# Patient Record
Sex: MALE | Race: WHITE
[De-identification: names, ages, dates, MRNs, and addresses within clinical notes are randomized per-mention and may not be internally consistent; named-entity substitution may affect disease eponyms.]

---

## 2018-10-23 ENCOUNTER — HOSPITAL ENCOUNTER (EMERGENCY)
Dept: HOSPITAL 47 - EC | Age: 23
LOS: 1 days | Discharge: HOME | End: 2018-10-24
Payer: COMMERCIAL

## 2018-10-23 DIAGNOSIS — F17.200: ICD-10-CM

## 2018-10-23 DIAGNOSIS — R74.8: ICD-10-CM

## 2018-10-23 DIAGNOSIS — Z88.0: ICD-10-CM

## 2018-10-23 DIAGNOSIS — B34.9: Primary | ICD-10-CM

## 2018-10-23 DIAGNOSIS — Z79.899: ICD-10-CM

## 2018-10-23 DIAGNOSIS — Z88.6: ICD-10-CM

## 2018-10-23 DIAGNOSIS — F90.9: ICD-10-CM

## 2018-10-23 PROCEDURE — 71046 X-RAY EXAM CHEST 2 VIEWS: CPT

## 2018-10-23 PROCEDURE — 81001 URINALYSIS AUTO W/SCOPE: CPT

## 2018-10-23 PROCEDURE — 96360 HYDRATION IV INFUSION INIT: CPT

## 2018-10-23 PROCEDURE — 99284 EMERGENCY DEPT VISIT MOD MDM: CPT

## 2018-10-23 PROCEDURE — 36415 COLL VENOUS BLD VENIPUNCTURE: CPT

## 2018-10-23 PROCEDURE — 80053 COMPREHEN METABOLIC PANEL: CPT

## 2018-10-23 PROCEDURE — 82550 ASSAY OF CK (CPK): CPT

## 2018-10-23 PROCEDURE — 85025 COMPLETE CBC W/AUTO DIFF WBC: CPT

## 2018-10-23 PROCEDURE — 87502 INFLUENZA DNA AMP PROBE: CPT

## 2018-10-23 NOTE — ED
General Adult HPI





- General


Chief complaint: Recheck/Abnormal Lab/Rx


Stated complaint: DIZZINESS,VOMITING,ENT


Time Seen by Provider: 10/23/18 23:04


Source: patient


Mode of arrival: ambulatory


Limitations: no limitations





- History of Present Illness


Initial comments: 





Otf Isabel is a 22 yo male with no PMH who presents to the ED for evaluation 

of 4 days of feeling unwell. Patient reports generalized malaise, body aches, 

subjective fevers, chills, nausea, decreased oral intake, non-productive cough 

and headache.  She reports that his children had runny and stuffy nose with 

fever last week, he reports that on Saturday he began to feel unwell.  He 

reports that he has been trying to drink fluids but has had no appetite.  He 

hasn't taken any medications for his symptoms.  He came to the ER today 

concerned about missing work due to his symptoms.





- Related Data


 Home Medications











 Medication  Instructions  Recorded  Confirmed


 


Dextroamphetamine/Amphetamine 30 mg PO QAM 11/04/17 10/23/18





[Adderall Xr]   


 


Ibuprofen [Motrin] 800 mg PO Q8H PRN 11/04/17 10/23/18











 Allergies











Allergy/AdvReac Type Severity Reaction Status Date / Time


 


meloxicam [From Mobic] Allergy  Rash/Hives Verified 10/23/18 23:21


 


Penicillins Allergy  Anaphylaxis Verified 10/23/18 23:21














Review of Systems


ROS Statement: 


Those systems with pertinent positive or pertinent negative responses have been 

documented in the HPI.





ROS Other: All systems not noted in ROS Statement are negative.


Constitutional: Reports: fever, chills


Eyes: Denies: eye pain


ENT: Reports: throat pain


Respiratory: Reports: cough.  Denies: dyspnea


Cardiovascular: Denies: chest pain, palpitations


Endocrine: Reports: fatigue


Gastrointestinal: Reports: nausea


Genitourinary: Denies: urgency, dysuria


Musculoskeletal: Reports: myalgia.  Denies: back pain


Skin: Denies: rash


Neurological: Reports: headache





Past Medical History


Past Medical History: No Reported History


History of Any Multi-Drug Resistant Organisms: None Reported


Past Surgical History: No Surgical Hx Reported


Past Psychological History: ADD/ADHD


Smoking Status: Current every day smoker


Past Alcohol Use History: None Reported


Past Drug Use History: None Reported





General Exam





- General Exam Comments


Initial Comments: 


Physical Exam


GENERAL:


Patient is well-developed and well-nourished.  Patient is nontoxic and well-

hydrated and is in no distress.





HENT:


Normocephalic, Atraumatic. 





EYES:


PERRL, EOMI





PULMONARY:


Unlabored respirations.  No audible rales rhonchi or wheezing was noted.





CARDIOVASCULAR:


There is a regular rate and rhythm without any murmurs gallops or rubs.  





ABDOMEN:


Soft and nontender with normal bowel sounds. 





SKIN:


Skin is clear with no lesions or rashes and otherwise unremarkable.





: 


Deferred





NEUROLOGIC:


Patient is alert and oriented x3.  Moving all extremities spontaneously





MUSCULOSKELETAL:


Normal extremities with adequate strength and full range of motion.  No lower 

extremity swelling or edema.  No calf tenderness.  





PSYCHIATRIC:


Normal psychiatric evaluation.  





Limitations: no limitations





Limitations: no limitations





Course


 Vital Signs











  10/23/18 10/24/18





  22:59 01:16


 


Temperature 98.7 F 98.3 F


 


Pulse Rate 107 H 86


 


Respiratory 18 16





Rate  


 


Blood Pressure 137/80 133/83


 


O2 Sat by Pulse 99 100





Oximetry  














Medical Decision Making





- Medical Decision Making


Patient was seen and evaluated, history was obtained from the patient


Patient with multiple complaints, generalized malaise, body aches for a few 

days duration, appears dehydrated





Patient has not ever received her influenza vaccination due to history of his 

father having a serious ALLERGY to such.





Labs and x-ray were ordered


Labs with mildly elevated creatinine kinase, no other significant abnormalities 

appreciated


Chest x-ray with no evidence of pneumonia


Influenza was negative





At this point I suspect the patient has a viral illness, I discussed this with 

him, I discussed the importance of oral rehydration therapy, alternating 

Tylenol and Motrin for pain and fever management.  Return parameters were 

discussed and the patient was discharged home in stable condition.





- Lab Data


Result diagrams: 


 10/24/18 00:16





 10/24/18 00:16


 Lab Results











  10/24/18 10/24/18 10/24/18 Range/Units





  00:16 00:16 00:20 


 


WBC  10.6    (3.8-10.6)  k/uL


 


RBC  5.54    (4.30-5.90)  m/uL


 


Hgb  16.5    (13.0-17.5)  gm/dL


 


Hct  48.7    (39.0-53.0)  %


 


MCV  87.9    (80.0-100.0)  fL


 


MCH  29.8    (25.0-35.0)  pg


 


MCHC  33.9    (31.0-37.0)  g/dL


 


RDW  13.3    (11.5-15.5)  %


 


Plt Count  274    (150-450)  k/uL


 


Neutrophils %  72    %


 


Lymphocytes %  18    %


 


Monocytes %  6    %


 


Eosinophils %  3    %


 


Basophils %  0    %


 


Neutrophils #  7.7    (1.3-7.7)  k/uL


 


Lymphocytes #  1.9    (1.0-4.8)  k/uL


 


Monocytes #  0.7    (0-1.0)  k/uL


 


Eosinophils #  0.3    (0-0.7)  k/uL


 


Basophils #  0.0    (0-0.2)  k/uL


 


Sodium   140   (137-145)  mmol/L


 


Potassium   4.6   (3.5-5.1)  mmol/L


 


Chloride   103   ()  mmol/L


 


Carbon Dioxide   24   (22-30)  mmol/L


 


Anion Gap   13   mmol/L


 


BUN   12   (9-20)  mg/dL


 


Creatinine   0.77   (0.66-1.25)  mg/dL


 


Est GFR (CKD-EPI)AfAm   >90   (>60 ml/min/1.73 sqM)  


 


Est GFR (CKD-EPI)NonAf   >90   (>60 ml/min/1.73 sqM)  


 


Glucose   100 H   (74-99)  mg/dL


 


Calcium   10.2   (8.4-10.2)  mg/dL


 


Total Bilirubin   0.6   (0.2-1.3)  mg/dL


 


AST   42   (17-59)  U/L


 


ALT   79 H   (21-72)  U/L


 


Alkaline Phosphatase   106   ()  U/L


 


Creatine Kinase   235 H   ()  U/L


 


Total Protein   8.5 H   (6.3-8.2)  g/dL


 


Albumin   5.0   (3.5-5.0)  g/dL


 


Urine Color    Yellow  


 


Urine Appearance    Clear  (Clear)  


 


Urine pH    6.0  (5.0-8.0)  


 


Ur Specific Gravity    1.023  (1.001-1.035)  


 


Urine Protein    Trace H  (Negative)  


 


Urine Glucose (UA)    Negative  (Negative)  


 


Urine Ketones    Negative  (Negative)  


 


Urine Blood    Trace H  (Negative)  


 


Urine Nitrite    Negative  (Negative)  


 


Urine Bilirubin    Negative  (Negative)  


 


Urine Urobilinogen    <2.0  (<2.0)  mg/dL


 


Ur Leukocyte Esterase    Negative  (Negative)  


 


Urine RBC    4  (0-5)  /hpf


 


Urine WBC    2  (0-5)  /hpf


 


Urine Mucus    Many H  (None)  /hpf


 


Influenza Type A RNA     (Not Detectd)  


 


Influenza Type B (PCR)     (Not Detectd)  














  10/24/18 Range/Units





  00:20 


 


WBC   (3.8-10.6)  k/uL


 


RBC   (4.30-5.90)  m/uL


 


Hgb   (13.0-17.5)  gm/dL


 


Hct   (39.0-53.0)  %


 


MCV   (80.0-100.0)  fL


 


MCH   (25.0-35.0)  pg


 


MCHC   (31.0-37.0)  g/dL


 


RDW   (11.5-15.5)  %


 


Plt Count   (150-450)  k/uL


 


Neutrophils %   %


 


Lymphocytes %   %


 


Monocytes %   %


 


Eosinophils %   %


 


Basophils %   %


 


Neutrophils #   (1.3-7.7)  k/uL


 


Lymphocytes #   (1.0-4.8)  k/uL


 


Monocytes #   (0-1.0)  k/uL


 


Eosinophils #   (0-0.7)  k/uL


 


Basophils #   (0-0.2)  k/uL


 


Sodium   (137-145)  mmol/L


 


Potassium   (3.5-5.1)  mmol/L


 


Chloride   ()  mmol/L


 


Carbon Dioxide   (22-30)  mmol/L


 


Anion Gap   mmol/L


 


BUN   (9-20)  mg/dL


 


Creatinine   (0.66-1.25)  mg/dL


 


Est GFR (CKD-EPI)AfAm   (>60 ml/min/1.73 sqM)  


 


Est GFR (CKD-EPI)NonAf   (>60 ml/min/1.73 sqM)  


 


Glucose   (74-99)  mg/dL


 


Calcium   (8.4-10.2)  mg/dL


 


Total Bilirubin   (0.2-1.3)  mg/dL


 


AST   (17-59)  U/L


 


ALT   (21-72)  U/L


 


Alkaline Phosphatase   ()  U/L


 


Creatine Kinase   ()  U/L


 


Total Protein   (6.3-8.2)  g/dL


 


Albumin   (3.5-5.0)  g/dL


 


Urine Color   


 


Urine Appearance   (Clear)  


 


Urine pH   (5.0-8.0)  


 


Ur Specific Gravity   (1.001-1.035)  


 


Urine Protein   (Negative)  


 


Urine Glucose (UA)   (Negative)  


 


Urine Ketones   (Negative)  


 


Urine Blood   (Negative)  


 


Urine Nitrite   (Negative)  


 


Urine Bilirubin   (Negative)  


 


Urine Urobilinogen   (<2.0)  mg/dL


 


Ur Leukocyte Esterase   (Negative)  


 


Urine RBC   (0-5)  /hpf


 


Urine WBC   (0-5)  /hpf


 


Urine Mucus   (None)  /hpf


 


Influenza Type A RNA  Not Detected  (Not Detectd)  


 


Influenza Type B (PCR)  Not Detected  (Not Detectd)  














Disposition


Clinical Impression: 


 Viral illness





Disposition: HOME SELF-CARE


Condition: Good


Instructions:  Viral Syndrome (ED)


Is patient prescribed a controlled substance at d/c from ED?: No


Referrals: 


Nyla Rudd DO [Primary Care Provider] - 1-2 days

## 2018-10-24 VITALS
SYSTOLIC BLOOD PRESSURE: 138 MMHG | DIASTOLIC BLOOD PRESSURE: 89 MMHG | RESPIRATION RATE: 18 BRPM | TEMPERATURE: 99.9 F | HEART RATE: 98 BPM

## 2018-10-24 LAB
ALBUMIN SERPL-MCNC: 5 G/DL (ref 3.5–5)
ALP SERPL-CCNC: 106 U/L (ref 38–126)
ALT SERPL-CCNC: 79 U/L (ref 21–72)
ANION GAP SERPL CALC-SCNC: 13 MMOL/L
AST SERPL-CCNC: 42 U/L (ref 17–59)
BASOPHILS # BLD AUTO: 0 K/UL (ref 0–0.2)
BASOPHILS NFR BLD AUTO: 0 %
BUN SERPL-SCNC: 12 MG/DL (ref 9–20)
CALCIUM SPEC-MCNC: 10.2 MG/DL (ref 8.4–10.2)
CHLORIDE SERPL-SCNC: 103 MMOL/L (ref 98–107)
CK SERPL-CCNC: 235 U/L (ref 55–170)
CO2 SERPL-SCNC: 24 MMOL/L (ref 22–30)
EOSINOPHIL # BLD AUTO: 0.3 K/UL (ref 0–0.7)
EOSINOPHIL NFR BLD AUTO: 3 %
ERYTHROCYTE [DISTWIDTH] IN BLOOD BY AUTOMATED COUNT: 5.54 M/UL (ref 4.3–5.9)
ERYTHROCYTE [DISTWIDTH] IN BLOOD: 13.3 % (ref 11.5–15.5)
GLUCOSE SERPL-MCNC: 100 MG/DL (ref 74–99)
HCT VFR BLD AUTO: 48.7 % (ref 39–53)
HGB BLD-MCNC: 16.5 GM/DL (ref 13–17.5)
LYMPHOCYTES # SPEC AUTO: 1.9 K/UL (ref 1–4.8)
LYMPHOCYTES NFR SPEC AUTO: 18 %
MCH RBC QN AUTO: 29.8 PG (ref 25–35)
MCHC RBC AUTO-ENTMCNC: 33.9 G/DL (ref 31–37)
MCV RBC AUTO: 87.9 FL (ref 80–100)
MONOCYTES # BLD AUTO: 0.7 K/UL (ref 0–1)
MONOCYTES NFR BLD AUTO: 6 %
NEUTROPHILS # BLD AUTO: 7.7 K/UL (ref 1.3–7.7)
NEUTROPHILS NFR BLD AUTO: 72 %
PH UR: 6 [PH] (ref 5–8)
PLATELET # BLD AUTO: 274 K/UL (ref 150–450)
POTASSIUM SERPL-SCNC: 4.6 MMOL/L (ref 3.5–5.1)
PROT SERPL-MCNC: 8.5 G/DL (ref 6.3–8.2)
RBC UR QL: 4 /HPF (ref 0–5)
SODIUM SERPL-SCNC: 140 MMOL/L (ref 137–145)
SP GR UR: 1.02 (ref 1–1.03)
UROBILINOGEN UR QL STRIP: <2 MG/DL (ref ?–2)
WBC # BLD AUTO: 10.6 K/UL (ref 3.8–10.6)
WBC #/AREA URNS HPF: 2 /HPF (ref 0–5)

## 2018-10-24 NOTE — XR
EXAMINATION TYPE: XR chest 2V

 

DATE OF EXAM: 10/23/2018

 

COMPARISON: 11/4/2017

 

HISTORY: Fever and dizziness

 

TECHNIQUE:  Frontal and lateral views of the chest are obtained.

 

FINDINGS:  Heart and mediastinum are normal. Lungs are clear. Diaphragm is normal. Bony thorax appear
s normal.

 

IMPRESSION:  Normal chest. No change.

## 2018-11-26 ENCOUNTER — HOSPITAL ENCOUNTER (EMERGENCY)
Dept: HOSPITAL 47 - EC | Age: 23
Discharge: HOME | End: 2018-11-26
Payer: COMMERCIAL

## 2018-11-26 VITALS — DIASTOLIC BLOOD PRESSURE: 81 MMHG | HEART RATE: 112 BPM | TEMPERATURE: 102.1 F | SYSTOLIC BLOOD PRESSURE: 158 MMHG

## 2018-11-26 VITALS — RESPIRATION RATE: 18 BRPM

## 2018-11-26 DIAGNOSIS — Z88.0: ICD-10-CM

## 2018-11-26 DIAGNOSIS — F17.200: ICD-10-CM

## 2018-11-26 DIAGNOSIS — Z79.899: ICD-10-CM

## 2018-11-26 DIAGNOSIS — Z88.6: ICD-10-CM

## 2018-11-26 DIAGNOSIS — M54.9: Primary | ICD-10-CM

## 2018-11-26 DIAGNOSIS — R10.9: ICD-10-CM

## 2018-11-26 DIAGNOSIS — F90.9: ICD-10-CM

## 2018-11-26 DIAGNOSIS — R50.9: ICD-10-CM

## 2018-11-26 LAB
ALBUMIN SERPL-MCNC: 4.5 G/DL (ref 3.5–5)
ALP SERPL-CCNC: 211 U/L (ref 38–126)
ALT SERPL-CCNC: 205 U/L (ref 21–72)
AMYLASE SERPL-CCNC: 94 U/L (ref 30–110)
ANION GAP SERPL CALC-SCNC: 11 MMOL/L
AST SERPL-CCNC: 119 U/L (ref 17–59)
BASOPHILS # BLD AUTO: 0.1 K/UL (ref 0–0.2)
BASOPHILS NFR BLD AUTO: 1 %
BUN SERPL-SCNC: 14 MG/DL (ref 9–20)
CALCIUM SPEC-MCNC: 9.6 MG/DL (ref 8.4–10.2)
CHLORIDE SERPL-SCNC: 106 MMOL/L (ref 98–107)
CO2 SERPL-SCNC: 24 MMOL/L (ref 22–30)
EOSINOPHIL # BLD AUTO: 0.1 K/UL (ref 0–0.7)
EOSINOPHIL NFR BLD AUTO: 1 %
ERYTHROCYTE [DISTWIDTH] IN BLOOD BY AUTOMATED COUNT: 5.5 M/UL (ref 4.3–5.9)
ERYTHROCYTE [DISTWIDTH] IN BLOOD: 13.5 % (ref 11.5–15.5)
GLUCOSE SERPL-MCNC: 99 MG/DL (ref 74–99)
HCT VFR BLD AUTO: 49 % (ref 39–53)
HGB BLD-MCNC: 16.3 GM/DL (ref 13–17.5)
LIPASE SERPL-CCNC: 44 U/L (ref 23–300)
LYMPHOCYTES # SPEC AUTO: 3.5 K/UL (ref 1–4.8)
LYMPHOCYTES NFR SPEC AUTO: 50 %
MCH RBC QN AUTO: 29.6 PG (ref 25–35)
MCHC RBC AUTO-ENTMCNC: 33.2 G/DL (ref 31–37)
MCV RBC AUTO: 89.1 FL (ref 80–100)
MONOCYTES # BLD AUTO: 0.3 K/UL (ref 0–1)
MONOCYTES NFR BLD AUTO: 4 %
NEUTROPHILS # BLD AUTO: 2.4 K/UL (ref 1.3–7.7)
NEUTROPHILS NFR BLD AUTO: 35 %
PH UR: 5.5 [PH] (ref 5–8)
PLATELET # BLD AUTO: 220 K/UL (ref 150–450)
POTASSIUM SERPL-SCNC: 4.5 MMOL/L (ref 3.5–5.1)
PROT SERPL-MCNC: 8.1 G/DL (ref 6.3–8.2)
SODIUM SERPL-SCNC: 141 MMOL/L (ref 137–145)
SP GR UR: 1.02 (ref 1–1.03)
UROBILINOGEN UR QL STRIP: <2 MG/DL (ref ?–2)
WBC # BLD AUTO: 6.9 K/UL (ref 3.8–10.6)

## 2018-11-26 PROCEDURE — 80053 COMPREHEN METABOLIC PANEL: CPT

## 2018-11-26 PROCEDURE — 99285 EMERGENCY DEPT VISIT HI MDM: CPT

## 2018-11-26 PROCEDURE — 76705 ECHO EXAM OF ABDOMEN: CPT

## 2018-11-26 PROCEDURE — 82150 ASSAY OF AMYLASE: CPT

## 2018-11-26 PROCEDURE — 81003 URINALYSIS AUTO W/O SCOPE: CPT

## 2018-11-26 PROCEDURE — 83690 ASSAY OF LIPASE: CPT

## 2018-11-26 PROCEDURE — 74018 RADEX ABDOMEN 1 VIEW: CPT

## 2018-11-26 PROCEDURE — 85025 COMPLETE CBC W/AUTO DIFF WBC: CPT

## 2018-11-26 PROCEDURE — 36415 COLL VENOUS BLD VENIPUNCTURE: CPT

## 2018-11-26 NOTE — ED
Abdominal Pain HPI





- General


Chief Complaint: Abdominal Pain


Stated Complaint: Congestion/blood in urine


Time Seen by Provider: 11/26/18 19:16


Source: patient, RN notes reviewed, old records reviewed


Mode of arrival: ambulatory


Limitations: no limitations





- History of Present Illness


Initial Comments: 





This is a 23-year-old male the ER for evaluation.  Patient is multiple 

complaints mainly bilateral at back pain flank pain.  Increased cough and 

congestion with fever.  Patient has history of back pain chronic.  Patient 

states the symptoms of been on and off for about a month and usually was seen 

here in the ER about a month ago for the same.


MD Complaint: abdominal pain


-: month(s) (1)


Location: L flank, R flank


Radiation: back


Migration to: L flank, R flank, bilateral flank


Quality: aching


Consistency: constant


Improves With: nothing


Worsens With: movement


Treatments Prior to Arrival: NSAIDs





- Related Data


 Home Medications











 Medication  Instructions  Recorded  Confirmed


 


Dextroamphetamine/Amphetamine 30 mg PO QAM 11/04/17 11/26/18





[Adderall Xr]   


 


Ibuprofen [Motrin] 800 mg PO Q8H PRN 11/04/17 11/26/18


 


Albuterol Inhaler [Ventolin Hfa 2 puff INHALATION RT-Q6H PRN 11/26/18 11/26/18





Inhaler]   


 


Benzonatate [Tessalon Perles] 100 mg PO TID PRN 11/26/18 11/26/18











 Allergies











Allergy/AdvReac Type Severity Reaction Status Date / Time


 


meloxicam [From Mobic] Allergy  Rash/Hives Verified 11/26/18 20:08


 


Penicillins Allergy  Anaphylaxis Verified 11/26/18 20:08














Review of Systems


ROS Statement: 


Those systems with pertinent positive or pertinent negative responses have been 

documented in the HPI.





ROS Other: All systems not noted in ROS Statement are negative.





Past Medical History


Past Medical History: No Reported History


History of Any Multi-Drug Resistant Organisms: None Reported


Past Surgical History: No Surgical Hx Reported


Past Psychological History: ADD/ADHD


Smoking Status: Current every day smoker


Past Alcohol Use History: None Reported


Past Drug Use History: None Reported





General Exam


Limitations: no limitations


General appearance: alert, in no apparent distress


Head exam: Present: atraumatic, normocephalic, normal inspection


Eye exam: Present: normal appearance, PERRL, EOMI.  Absent: scleral icterus, 

conjunctival injection, periorbital swelling


ENT exam: Present: normal exam, mucous membranes moist


Neck exam: Present: normal inspection.  Absent: tenderness, meningismus, 

lymphadenopathy


Respiratory exam: Present: normal lung sounds bilaterally.  Absent: respiratory 

distress, wheezes, rales, rhonchi, stridor


Cardiovascular Exam: Present: normal rhythm, tachycardia, normal heart sounds.  

Absent: systolic murmur, diastolic murmur, rubs, gallop, clicks


GI/Abdominal exam: Present: soft, normal bowel sounds.  Absent: distended, 

tenderness, guarding, rebound, rigid


Extremities exam: Present: normal inspection, full ROM, normal capillary 

refill.  Absent: tenderness, pedal edema, joint swelling, calf tenderness


Back exam: Present: normal inspection, paraspinal tenderness (muscle).  Absent: 

tenderness (No midline tenderness)


Neurological exam: Present: alert, oriented X3, CN II-XII intact


Psychiatric exam: Present: normal affect, normal mood


Skin exam: Present: warm, dry, intact, normal color.  Absent: rash





Course


 Vital Signs











  11/26/18 11/26/18





  18:20 19:40


 


Temperature 100.2 F H 102.1 F H


 


Pulse Rate 107 H 112 H


 


Respiratory 18 18





Rate  


 


Blood Pressure 135/88 158/81


 


O2 Sat by Pulse 97 





Oximetry  














- Reevaluation(s)


Reevaluation #1: 





11/26/18 20:14


Medical record is reviewed





Patient is in no acute discomfort.  Patient states his pain is in his back 

which is pain that he always has





Patient states he is improved with fever control and hydration





This is with patient possibility of epidural infection, patient returned back 

pain worsens or fever increase








Medical Decision Making





- Medical Decision Making





3 male the ER with persistent fever times one month, fever is episodic patient 

also with back pain cough and congestion, runny nose.  Patient is afebrile here 

in the ER, back pain is not worse and is normal with no neurological deficit no 

new trauma.  Patient denies IV drug abuse, denies recent tattoos, ultrasound x-

ray negative.  Patient will be discharged home





- Lab Data


Result diagrams: 


 11/26/18 18:40





 11/26/18 18:40


 Lab Results











  11/26/18 11/26/18 11/26/18 Range/Units





  18:40 18:40 18:40 


 


WBC   6.9   (3.8-10.6)  k/uL


 


RBC   5.50   (4.30-5.90)  m/uL


 


Hgb   16.3   (13.0-17.5)  gm/dL


 


Hct   49.0   (39.0-53.0)  %


 


MCV   89.1   (80.0-100.0)  fL


 


MCH   29.6   (25.0-35.0)  pg


 


MCHC   33.2   (31.0-37.0)  g/dL


 


RDW   13.5   (11.5-15.5)  %


 


Plt Count   220   (150-450)  k/uL


 


Neutrophils %   35   %


 


Lymphocytes %   50   %


 


Monocytes %   4   %


 


Eosinophils %   1   %


 


Basophils %   1   %


 


Neutrophils #   2.4   (1.3-7.7)  k/uL


 


Lymphocytes #   3.5   (1.0-4.8)  k/uL


 


Monocytes #   0.3   (0-1.0)  k/uL


 


Eosinophils #   0.1   (0-0.7)  k/uL


 


Basophils #   0.1   (0-0.2)  k/uL


 


Sodium  141    (137-145)  mmol/L


 


Potassium  4.5    (3.5-5.1)  mmol/L


 


Chloride  106    ()  mmol/L


 


Carbon Dioxide  24    (22-30)  mmol/L


 


Anion Gap  11    mmol/L


 


BUN  14    (9-20)  mg/dL


 


Creatinine  0.79    (0.66-1.25)  mg/dL


 


Est GFR (CKD-EPI)AfAm  >90    (>60 ml/min/1.73 sqM)  


 


Est GFR (CKD-EPI)NonAf  >90    (>60 ml/min/1.73 sqM)  


 


Glucose  99    (74-99)  mg/dL


 


Calcium  9.6    (8.4-10.2)  mg/dL


 


Total Bilirubin  0.6    (0.2-1.3)  mg/dL


 


AST  119 H    (17-59)  U/L


 


ALT  205 H    (21-72)  U/L


 


Alkaline Phosphatase  211 H    ()  U/L


 


Total Protein  8.1    (6.3-8.2)  g/dL


 


Albumin  4.5    (3.5-5.0)  g/dL


 


Amylase  94    ()  U/L


 


Lipase  44    ()  U/L


 


Urine Color    Yellow  


 


Urine Appearance    Clear  (Clear)  


 


Urine pH    5.5  (5.0-8.0)  


 


Ur Specific Gravity    1.021  (1.001-1.035)  


 


Urine Protein    Negative  (Negative)  


 


Urine Glucose (UA)    Negative  (Negative)  


 


Urine Ketones    Negative  (Negative)  


 


Urine Blood    Negative  (Negative)  


 


Urine Nitrite    Negative  (Negative)  


 


Urine Bilirubin    Negative  (Negative)  


 


Urine Urobilinogen    <2.0  (<2.0)  mg/dL


 


Ur Leukocyte Esterase    Negative  (Negative)  














- Radiology Data


Radiology results: report reviewed (Ultrasound gallbladder x-ray KUB negative 

for acute disease), image reviewed





Disposition


Clinical Impression: 


 Fever, Back pain





Disposition: HOME SELF-CARE


Condition: Good


Instructions:  Fever in Adults (ED), Viral Syndrome (ED)


Is patient prescribed a controlled substance at d/c from ED?: No


Referrals: 


Nyla Rudd DO [Primary Care Provider] - 1-2 days

## 2018-11-26 NOTE — XR
EXAMINATION TYPE: XR KUB

 

DATE OF EXAM: 11/26/2018

 

COMPARISON: NONE

 

HISTORY: Abdominal pain

 

TECHNIQUE: 2 views upright

 

FINDINGS: Bowel gas pattern is normal. There is no sign of intestinal obstruction or pneumoperitoneum
. Fecal pattern is normal. Lung bases are clear. There are no pathologic calcifications over the kidn
eys.

 

IMPRESSION: Nonacute abdomen.

## 2018-11-26 NOTE — US
EXAMINATION TYPE: US gallbladder

 

DATE OF EXAM: 11/26/2018

 

COMPARISON: NONE

 

CLINICAL HISTORY: Pain. Pain

 

EXAM MEASUREMENTS:

 

Liver Length:  18.9 cm   

Gallbladder Wall:  0.3 cm   

CBD:  0.3 cm

Right Kidney:  11.7 x 4.7 x 4.2 cm

 

 

 

Pancreas:  Obscured by bowel gas

Liver:  Increased attenuation  

Gallbladder:  No stones seen

**Evidence for sonographic Adams's sign:  No

CBD:  wnl 

Right Kidney:  wnl 

 

 

 

IMPRESSION: Negative exam. No gallstones or dilated ducts.

## 2018-12-04 ENCOUNTER — HOSPITAL ENCOUNTER (OUTPATIENT)
Dept: HOSPITAL 47 - RADNMMAIN | Age: 23
Discharge: HOME | End: 2018-12-04
Attending: FAMILY MEDICINE
Payer: COMMERCIAL

## 2018-12-04 DIAGNOSIS — R11.0: Primary | ICD-10-CM

## 2018-12-04 PROCEDURE — 78226 HEPATOBILIARY SYSTEM IMAGING: CPT

## 2018-12-04 NOTE — NM
Nuclear medicine hepatobiliary scan.

 

HISTORY: Pain.

 

DOSAGE: The patient received 8 ounces of ensure plus and  4.8  mCi of Technetium 99m Choletec.  

 

FINDINGS: There is normal hepatic extraction.  The gallbladder is seen by 10 minutes.  There is bilia
ry to bowel clearance by 25 minutes.  Ejection fraction is 83%.

 

IMPRESSION:

1. No diagnostic evidence of cholecystitis.

2. Ejection fraction is 83%.

## 2018-12-20 ENCOUNTER — HOSPITAL ENCOUNTER (OUTPATIENT)
Dept: HOSPITAL 47 - OR | Age: 23
Discharge: HOME | End: 2018-12-20
Attending: SURGERY
Payer: COMMERCIAL

## 2018-12-20 VITALS — BODY MASS INDEX: 34.1 KG/M2

## 2018-12-20 VITALS — DIASTOLIC BLOOD PRESSURE: 78 MMHG | SYSTOLIC BLOOD PRESSURE: 122 MMHG | HEART RATE: 92 BPM

## 2018-12-20 VITALS — TEMPERATURE: 97 F

## 2018-12-20 VITALS — RESPIRATION RATE: 18 BRPM

## 2018-12-20 DIAGNOSIS — K81.9: Primary | ICD-10-CM

## 2018-12-20 DIAGNOSIS — F90.9: ICD-10-CM

## 2018-12-20 DIAGNOSIS — K81.1: ICD-10-CM

## 2018-12-20 DIAGNOSIS — Z88.0: ICD-10-CM

## 2018-12-20 DIAGNOSIS — F17.210: ICD-10-CM

## 2018-12-20 DIAGNOSIS — M19.90: ICD-10-CM

## 2018-12-20 DIAGNOSIS — Z79.899: ICD-10-CM

## 2018-12-20 DIAGNOSIS — Z88.6: ICD-10-CM

## 2018-12-20 PROCEDURE — 47562 LAPAROSCOPIC CHOLECYSTECTOMY: CPT

## 2018-12-20 PROCEDURE — 88304 TISSUE EXAM BY PATHOLOGIST: CPT

## 2018-12-20 RX ADMIN — FENTANYL CITRATE PRN MCG: 50 INJECTION, SOLUTION INTRAMUSCULAR; INTRAVENOUS at 12:03

## 2018-12-20 RX ADMIN — FENTANYL CITRATE PRN MCG: 50 INJECTION, SOLUTION INTRAMUSCULAR; INTRAVENOUS at 12:08

## 2018-12-20 NOTE — P.GSHP
History of Present Illness


H&P Date: 12/20/18


Chief Complaint: Right upper quadrant pain





This is a 23-year-old male presents today for laparoscopic cholecystectomy.  

Patient's had complaints of right upper quadrant pain.  His recent HIDA scan 

shows an elevated ejection fraction consistent with biliary hyperkinesia's and 

chronic cholecystitis.





Past Medical History


Past Medical History: Osteoarthritis (OA)


Additional Past Medical History / Comment(s): STATES ARTHRITIS IN BACK, ? 

ASTHMA - STATES DR UNSURE., CONSTIPATION, STATES HAVING ABDOMINAL PAIN WITH 

NAUSEA & VOMITING.


History of Any Multi-Drug Resistant Organisms: None Reported


Past Surgical History: No Surgical Hx Reported


Additional Past Anesthesia/Blood Transfusion Reaction / Comment(s): NO 

ANESTHESIA HX.


Past Psychological History: ADD/ADHD


Smoking Status: Current every day smoker


Past Alcohol Use History: None Reported


Additional Past Alcohol Use History / Comment(s): SMOKES 1/2 PPD.  SMOKING 

SINCE 12 YEARS OLD.


Past Drug Use History: None Reported





- Past Family History


  ** Mother


Family Medical History: No Reported History





Medications and Allergies


 Home Medications











 Medication  Instructions  Recorded  Confirmed  Type


 


Dextroamphetamine/Amphetamine 30 mg PO QAM 11/04/17 12/20/18 History





[Adderall Xr]    


 


Ibuprofen [Motrin] 800 mg PO Q8H PRN 11/04/17 12/17/18 History


 


Ondansetron [Zofran] 4 mg PO Q6HR PRN 12/17/18 12/17/18 History











 Allergies











Allergy/AdvReac Type Severity Reaction Status Date / Time


 


meloxicam [From Mobic] Allergy  Rash/Hives Verified 12/20/18 08:41


 


Penicillins Allergy  Anaphylaxis Verified 12/20/18 08:41














Surgical - Exam


 Vital Signs











Temp Pulse Resp BP Pulse Ox


 


 98.0 F   91   16   136/63   97 


 


 12/20/18 08:55  12/20/18 08:55  12/20/18 08:55  12/20/18 08:55  12/20/18 08:55














- General


well developed, well nourished, no distress





- Eyes


PERRL





- ENT


normal pinna





- Neck


no masses





- Respiratory


normal expansion





- Cardiovascular


Rhythm: regular





- Abdomen


Abdomen: soft, non tender





Assessment and Plan


Assessment: 





Right quadrant pain





Chronic cholecystitis





We'll perform laparoscopic cholecystectomy

## 2018-12-20 NOTE — P.OP
Date of Procedure: 12/20/18


Preoperative Diagnosis: 


Cholecystitis


Postoperative Diagnosis: 


Cholecystitis


Procedure(s) Performed: 


Laparoscopic cholecystectomy


Anesthesia: ABIMAEL


Surgeon: Rock Laird


Estimated Blood Loss (ml): 5


Pathology: other (gAll bladder)


Condition: stable


Disposition: PACU


Description of Procedure: 


The patient was placed on the operating table.   The patient received a general 

endotracheal tube anesthesia.    The patients abdomen was prepped and draped 

in the usual sterile fashion.    Through an infraumbilical stab incision, the 

fascia of the anterior abdominal wall was grasped with a pair of Kochers and 

then the Veress needle was placed in the peritoneal cavity.   Position of the 

Veress needle was confirmed with positive drop test.   The abdomen was then 

insufflated.  After adequate insufflation, the 10 mm trocar was placed in the 

peritoneal cavity.    Following this the laparoscope was placed in the 

peritoneal cavity. The patient was placed in the head-up, right side up 

position and then a 5 mm trocar was placed in the right lateral and right 

subcostal position under direct visualization.    A 8 mm trocar was placed in 

the epigastric position.  The gallbladder was grasped in the fundus and 

infundibulum.  Traction  on the gallbladder was placed in the lateral and the 

cephalad positions.  The triangle of Calot  was visualized..   The cystic duct 

was bluntly dissected until the union of the cystic duct and common bile duct 

was seen.    The cystic duct was then divided and sealed with the Harmonic 

scissors.  A PDS Endoloop was then placed throughout the cystic duct stump.  

The cystic artery divided and sealed with the Harmonic scissors.   The 

gallbladder was then removed from the liver bed using Harmonic scissors.   The 

gallbladder was then extracted through the epigastric port site.  Operative 

field was checked for any bleeding spots and Harmonic scissors was used to 

coagulate the liver bed.    The abdomen was irrigated.   The trocars were 

removed.  The skin was closed using interrupted 3-0 Vicryl suture.   Dermabond 

dressing were applied.   The patient tolerated the procedure well.

## 2020-08-05 ENCOUNTER — HOSPITAL ENCOUNTER (EMERGENCY)
Dept: HOSPITAL 47 - EC | Age: 25
Discharge: HOME | End: 2020-08-05
Payer: COMMERCIAL

## 2020-08-05 VITALS — HEART RATE: 91 BPM | SYSTOLIC BLOOD PRESSURE: 141 MMHG | TEMPERATURE: 97.8 F | DIASTOLIC BLOOD PRESSURE: 95 MMHG

## 2020-08-05 VITALS — RESPIRATION RATE: 18 BRPM

## 2020-08-05 DIAGNOSIS — Z88.6: ICD-10-CM
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
## 2020-08-05 DIAGNOSIS — F17.200: ICD-10-CM

## 2020-08-05 DIAGNOSIS — Z88.0: ICD-10-CM

## 2020-08-05 DIAGNOSIS — S70.12XA: Primary | ICD-10-CM

## 2020-08-05 DIAGNOSIS — Z79.899: ICD-10-CM
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
## 2020-08-05 DIAGNOSIS — Z79.1: ICD-10-CM

## 2020-08-05 DIAGNOSIS — F90.9: ICD-10-CM

## 2020-08-05 DIAGNOSIS — M19.90: ICD-10-CM

## 2020-08-05 DIAGNOSIS — M89.9: ICD-10-CM
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
## 2020-08-05 LAB
ALBUMIN SERPL-MCNC: 4.8 G/DL (ref 3.5–5)
ALP SERPL-CCNC: 85 U/L (ref 38–126)
ALT SERPL-CCNC: 99 U/L (ref 4–49)
ANION GAP SERPL CALC-SCNC: 9 MMOL/L
APTT BLD: 24.3 SEC (ref 22–30)
AST SERPL-CCNC: 49 U/L (ref 17–59)
BASOPHILS # BLD AUTO: 0 K/UL (ref 0–0.2)
BASOPHILS NFR BLD AUTO: 1 %
BUN SERPL-SCNC: 13 MG/DL (ref 9–20)
CALCIUM SPEC-MCNC: 10.3 MG/DL (ref 8.4–10.2)
CHLORIDE SERPL-SCNC: 107 MMOL/L (ref 98–107)
CO2 SERPL-SCNC: 23 MMOL/L (ref 22–30)
EOSINOPHIL # BLD AUTO: 0.2 K/UL (ref 0–0.7)
EOSINOPHIL NFR BLD AUTO: 3 %
ERYTHROCYTE [DISTWIDTH] IN BLOOD BY AUTOMATED COUNT: 5.23 M/UL (ref 4.3–5.9)
ERYTHROCYTE [DISTWIDTH] IN BLOOD: 13.1 % (ref 11.5–15.5)
GLUCOSE SERPL-MCNC: 110 MG/DL (ref 74–99)
HCT VFR BLD AUTO: 46.9 % (ref 39–53)
HGB BLD-MCNC: 15.5 GM/DL (ref 13–17.5)
INR PPP: 0.9 (ref ?–1.2)
LYMPHOCYTES # SPEC AUTO: 2.7 K/UL (ref 1–4.8)
LYMPHOCYTES NFR SPEC AUTO: 41 %
MCH RBC QN AUTO: 29.6 PG (ref 25–35)
MCHC RBC AUTO-ENTMCNC: 33 G/DL (ref 31–37)
MCV RBC AUTO: 89.8 FL (ref 80–100)
MONOCYTES # BLD AUTO: 0.4 K/UL (ref 0–1)
MONOCYTES NFR BLD AUTO: 6 %
NEUTROPHILS # BLD AUTO: 3.2 K/UL (ref 1.3–7.7)
NEUTROPHILS NFR BLD AUTO: 48 %
PLATELET # BLD AUTO: 309 K/UL (ref 150–450)
POTASSIUM SERPL-SCNC: 4.7 MMOL/L (ref 3.5–5.1)
PROT SERPL-MCNC: 7.6 G/DL (ref 6.3–8.2)
PT BLD: 9.8 SEC (ref 9–12)
SODIUM SERPL-SCNC: 139 MMOL/L (ref 137–145)
WBC # BLD AUTO: 6.7 K/UL (ref 3.8–10.6)

## 2020-08-05 PROCEDURE — 36415 COLL VENOUS BLD VENIPUNCTURE: CPT

## 2020-08-05 PROCEDURE — 80053 COMPREHEN METABOLIC PANEL: CPT

## 2020-08-05 PROCEDURE — 99284 EMERGENCY DEPT VISIT MOD MDM: CPT

## 2020-08-05 PROCEDURE — 85730 THROMBOPLASTIN TIME PARTIAL: CPT

## 2020-08-05 PROCEDURE — 73701 CT LOWER EXTREMITY W/DYE: CPT

## 2020-08-05 PROCEDURE — 85025 COMPLETE CBC W/AUTO DIFF WBC: CPT

## 2020-08-05 PROCEDURE — 85610 PROTHROMBIN TIME: CPT

## 2020-08-05 NOTE — ED
General Adult HPI





- General


Chief complaint: Extremity Injury, Lower


Stated complaint: [facility] - lt thigh injury


Time Seen by Provider: 08/05/20 09:50


Source: patient, RN notes reviewed, old records reviewed


Mode of arrival: ambulatory


Limitations: no limitations





- History of Present Illness


Initial comments: 





Patient is a 24-year-old male presents emergency department today for evaluation

for concern for left thigh and knee swelling.  Patient reports that 2 weeks ago 

he was hit by a excavator  while at work.  He states he initially thought up his

primary care doctor whom completed x-rays and have the Patient off of work.  His

work sent him to an urgent care who cleared him to go back to work.  Patient 

states that he was intended to follow-up with orthopedic in given an MRI but has

not been able to follow through with this due to Worker's Comp difficulties.  He

states that he was concerned because he's had worsening pain and with range of 

motion of the knee and nerve pain with flexing the knee.  He states is feels 

like his skin is stretching.





- Related Data


                                Home Medications











 Medication  Instructions  Recorded  Confirmed


 


Dextroamphetamine/Amphetamine 30 mg PO QAM 11/04/17 08/05/20





[Adderall Xr]   


 


Ibuprofen [Motrin] 800 mg PO QAM 11/04/17 08/05/20


 


HYDROcodone/APAP 7.5-325MG [Norco 1 tab PO QID PRN 08/05/20 08/05/20





7.5-325]   








                                  Previous Rx's











 Medication  Instructions  Recorded


 


HYDROcodone/APAP 5-325MG [Norco 1 tab PO Q6HR PRN #10 tab 08/05/20





5-325]  











                                    Allergies











Allergy/AdvReac Type Severity Reaction Status Date / Time


 


meloxicam [From Mobic] Allergy  Rash/Hives Verified 08/05/20 11:08


 


Penicillins Allergy  Anaphylaxis Verified 08/05/20 11:08














Review of Systems


ROS Statement: 


Those systems with pertinent positive or pertinent negative responses have been 

documented in the HPI.





ROS Other: All systems not noted in ROS Statement are negative.





Past Medical History


Past Medical History: Asthma, Osteoarthritis (OA)


Additional Past Medical History / Comment(s): STATES ARTHRITIS IN BACK, CONSTIPA

TION


History of Any Multi-Drug Resistant Organisms: None Reported


Past Surgical History: Cholecystectomy


Additional Past Anesthesia/Blood Transfusion Reaction / Comment(s): NO 

ANESTHESIA HX.


Past Psychological History: ADD/ADHD


Smoking Status: Current every day smoker


Past Alcohol Use History: Occasional


Past Drug Use History: Marijuana





- Past Family History


  ** Mother


Family Medical History: No Reported History





General Exam





- General Exam Comments


Initial Comments: 





This is a 24-year-old male.  Alert and oriented 3.  No significant distress.


Limitations: no limitations


General appearance: alert, in no apparent distress


Head exam: Present: atraumatic, normocephalic, normal inspection


Eye exam: Present: normal appearance, PERRL, EOMI.  Absent: scleral icterus, 

conjunctival injection, periorbital swelling


ENT exam: Present: normal exam, mucous membranes moist


Neck exam: Present: normal inspection.  Absent: tenderness, meningismus, 

lymphadenopathy


Respiratory exam: Present: normal lung sounds bilaterally.  Absent: respiratory 

distress, wheezes, rales, rhonchi, stridor


Cardiovascular Exam: Present: regular rate, normal rhythm, normal heart sounds. 

Absent: systolic murmur, diastolic murmur, rubs, gallop, clicks


GI/Abdominal exam: Present: soft, normal bowel sounds.  Absent: distended, 

tenderness, guarding, rebound, rigid


Extremities exam: Present: normal inspection, full ROM, normal capillary refill.

 Absent: tenderness, pedal edema, joint swelling, calf tenderness


  ** Left


Upper Leg exam: Present: swelling (over distal quadracep ).  Absent: normal 

inspection, full ROM


Knee exam: Present: tenderness.  Absent: normal inspection


Lower Leg exam: Present: normal inspection, full ROM


Foot/Toe exam: Present: normal inspection


Neurovascular tendon exam: Present: no vascular compromise


Back exam: Present: normal inspection


Neurological exam: Present: alert, oriented X3, CN II-XII intact


Psychiatric exam: Present: normal affect, normal mood


Skin exam: Present: warm, dry, intact, normal color.  Absent: rash





Course


                                   Vital Signs











  08/05/20 08/05/20





  09:46 11:34


 


Temperature 99.3 F 97.8 F


 


Pulse Rate 113 H 91


 


Respiratory 18 18





Rate  


 


Blood Pressure 163/98 141/95


 


O2 Sat by Pulse 98 99





Oximetry  














Medical Decision Making





- Medical Decision Making





Patient is a 24-year-old male presents for short stay with left thigh and knee 

swelling.  Patient reports that he was hit by an excavator work 2 weeks ago.  He

had x-rays at PCPs office was reportedly normal.  He reports he is able bear 

weight but when he has his leg down the swelling seems to be more painful and 

intense.  Patient has normal pulses distally.  He reports significant pain with 

range of motion of the knee.  Patient had a computed tomography scan with 

contrast of the knee.  It shows evidence of a significant hematoma over the 

anterior aspect of the thigh.  No evidence of fractures.  There is also evidence

of a 6.2 bone lesion which was most likely an chondroma.  I discussed the 

Patient needs follow-up with orthopedic for further clearance and possible 

biopsy for the small lesion.  Patient be discharged at this time with a short 

course of pain medication.  Advise close follow-up with or so for further 

clearance back to work.  Given a note until that time.








Patient is given a knee immobilizer and stated that this is too uncomfortable to

wear.  Advised to use it while at home.





- Lab Data


Result diagrams: 


                                 08/05/20 10:44





                                 08/05/20 10:44


                                   Lab Results











  08/05/20 08/05/20 08/05/20 Range/Units





  10:44 10:44 10:44 


 


WBC  6.7    (3.8-10.6)  k/uL


 


RBC  5.23    (4.30-5.90)  m/uL


 


Hgb  15.5    (13.0-17.5)  gm/dL


 


Hct  46.9    (39.0-53.0)  %


 


MCV  89.8    (80.0-100.0)  fL


 


MCH  29.6    (25.0-35.0)  pg


 


MCHC  33.0    (31.0-37.0)  g/dL


 


RDW  13.1    (11.5-15.5)  %


 


Plt Count  309    (150-450)  k/uL


 


Neutrophils %  48    %


 


Lymphocytes %  41    %


 


Monocytes %  6    %


 


Eosinophils %  3    %


 


Basophils %  1    %


 


Neutrophils #  3.2    (1.3-7.7)  k/uL


 


Lymphocytes #  2.7    (1.0-4.8)  k/uL


 


Monocytes #  0.4    (0-1.0)  k/uL


 


Eosinophils #  0.2    (0-0.7)  k/uL


 


Basophils #  0.0    (0-0.2)  k/uL


 


PT    9.8  (9.0-12.0)  sec


 


INR    0.9  (<1.2)  


 


APTT    24.3  (22.0-30.0)  sec


 


Sodium   139   (137-145)  mmol/L


 


Potassium   4.7   (3.5-5.1)  mmol/L


 


Chloride   107   ()  mmol/L


 


Carbon Dioxide   23   (22-30)  mmol/L


 


Anion Gap   9   mmol/L


 


BUN   13   (9-20)  mg/dL


 


Creatinine   0.84   (0.66-1.25)  mg/dL


 


Est GFR (CKD-EPI)AfAm   >90   (>60 ml/min/1.73 sqM)  


 


Est GFR (CKD-EPI)NonAf   >90   (>60 ml/min/1.73 sqM)  


 


Glucose   110 H   (74-99)  mg/dL


 


Calcium   10.3 H   (8.4-10.2)  mg/dL


 


Total Bilirubin   0.4   (0.2-1.3)  mg/dL


 


AST   49   (17-59)  U/L


 


ALT   99 H   (4-49)  U/L


 


Alkaline Phosphatase   85   ()  U/L


 


Total Protein   7.6   (6.3-8.2)  g/dL


 


Albumin   4.8   (3.5-5.0)  g/dL














- Radiology Data


Radiology results: report reviewed


CC knee with contrast shows a thin walled fluid collection anterior distal 

femoral level presumed related to recent injury.  No acute fracture dislocation.

 No intramuscular vascular injury noted.  There is a 6. fourth distal femoral 

diaphyseal chondroid lesion fever nonaggressive etiology such as endocrine 

neuroma however due to size more aggressive etiologies such chondrosarcoma is 

not entirely excluded. Follow up advised espiecial if pat has pain not related 

to trauma to this level. 





Disposition


Clinical Impression: 


 Thigh hematoma, Lesion of femur





Disposition: HOME SELF-CARE


Condition: Good


Instructions (If sedation given, give patient instructions):  Hematoma (ED)


Additional Instructions: 


Please use medication as discussed.  Follow up with orthopedic tomorrow. Please 

return to the emergency room if your symptoms increase or worsen or for any 

other concerns.


Prescriptions: 


HYDROcodone/APAP 5-325MG [Norco 5-325] 1 tab PO Q6HR PRN #10 tab


 PRN Reason: Pain


Is patient prescribed a controlled substance at d/c from ED?: Yes


If prescribed controlled substance>3 days was MAPS reviewed?: Prescribed <3 Days


If opioid is for acute pain is fill amount 7 days or less?: Yes


If Rx opioid, was Start Talking consent form obtained?: Yes


Referrals: 


Nyla Rudd DO [Primary Care Provider] - 1-2 days


Dipak Aguilar DO [Doctor of Osteopathic Medicine] - 1-2 days


Time of Disposition: 12:02

## 2020-08-05 NOTE — CT
EXAMINATION TYPE: CT knee LT w con

 

DATE OF EXAM: 8/5/2020

 

COMPARISON: None.

 

HISTORY: Left distal femur injury and hematoma. Persistent pain and swelling after recent injury.

 

CT DLP: 309.7 mGycm

Automated exposure control for dose reduction was used.

 

CONTRAST: 

Performed with IV Contrast, patient injected with 100ml mL of Isovue 300.

 

FINDINGS: 

No acute fracture or dislocation in the left knee. Tricompartment joint spaces are preserved.

 

No significant joint effusion. No popliteal cyst. Patency of the distal superficial femoral artery an
d popliteal artery with successful visualization of bifurcation and trifurcation. No injury.

 

There is moderate superficial infrapatellar ill-defined fluid. There is more prominent thin-walled fl
uid collection in the superior prepatellar space measuring roughly 11 to 12 cm transversely axial kim
ge 60. Craniocaudal length roughly 16 16 cm sagittal image 31. All fluid is superficial to the anteri
or muscles. There is some more mild to moderate ill-defined surrounding fluid noted. Fluid more promi
nent medial to the midline.

 

Within the distal femoral diaphysis there is an oval 6.4 cm intramedullary lesion with calcifications
  along the periphery. No bony destruction or adjacent soft tissue mass.

Suspect nonaggressive etiology such as enchondroma.

 

IMPRESSION: 

1. Thin-walled fluid collection anterior distal femoral level presumed related to recent injury. No a
cute fracture or dislocation. No intramuscular or vascular injury.

2. There is 6.4 cm distal femoral diaphyseal chondroid lesion favor nonaggressive etiology such as en
chondroma however due to size more aggressive etiologies such as chondrosarcoma is not entirely exclu
ded. Follow-up advised especially if patient has pain not related to trauma localized to this level.

## 2020-10-14 ENCOUNTER — HOSPITAL ENCOUNTER (OUTPATIENT)
Dept: HOSPITAL 47 - LABWHC1 | Age: 25
Discharge: HOME | End: 2020-10-14
Attending: ORTHOPAEDIC SURGERY
Payer: COMMERCIAL

## 2020-10-14 DIAGNOSIS — S70.12XD: Primary | ICD-10-CM

## 2020-10-14 DIAGNOSIS — M89.8X5: ICD-10-CM

## 2020-10-14 DIAGNOSIS — M79.652: ICD-10-CM

## 2020-10-14 DIAGNOSIS — M25.562: ICD-10-CM

## 2020-10-14 DIAGNOSIS — L76.34: ICD-10-CM

## 2020-10-14 DIAGNOSIS — M25.462: ICD-10-CM

## 2020-10-14 PROCEDURE — 87070 CULTURE OTHR SPECIMN AEROBIC: CPT

## 2020-10-14 PROCEDURE — 87075 CULTR BACTERIA EXCEPT BLOOD: CPT

## 2020-10-14 PROCEDURE — 87205 SMEAR GRAM STAIN: CPT

## 2021-02-24 ENCOUNTER — HOSPITAL ENCOUNTER (EMERGENCY)
Dept: HOSPITAL 47 - EC | Age: 26
Discharge: HOME | End: 2021-02-24
Payer: COMMERCIAL

## 2021-02-24 VITALS
SYSTOLIC BLOOD PRESSURE: 150 MMHG | RESPIRATION RATE: 20 BRPM | TEMPERATURE: 98.3 F | DIASTOLIC BLOOD PRESSURE: 97 MMHG | HEART RATE: 108 BPM

## 2021-02-24 DIAGNOSIS — M19.09: ICD-10-CM

## 2021-02-24 DIAGNOSIS — Z88.6: ICD-10-CM

## 2021-02-24 DIAGNOSIS — Z79.1: ICD-10-CM

## 2021-02-24 DIAGNOSIS — Z79.899: ICD-10-CM

## 2021-02-24 DIAGNOSIS — F17.200: ICD-10-CM

## 2021-02-24 DIAGNOSIS — K04.7: Primary | ICD-10-CM

## 2021-02-24 DIAGNOSIS — F90.9: ICD-10-CM

## 2021-02-24 DIAGNOSIS — Z88.0: ICD-10-CM

## 2021-02-24 PROCEDURE — 99283 EMERGENCY DEPT VISIT LOW MDM: CPT

## 2021-02-24 NOTE — ED
ENT HPI





- General


Stated complaint: Face swelling


Time Seen by Provider: 02/24/21 19:50


Source: patient, RN notes reviewed


Mode of arrival: ambulatory


Limitations: no limitations





- History of Present Illness


Initial comments: 


25 year old male presents emergency Department with chief complaint of right-

sided facial swelling.  Patient states that he's had recurrent dental infections

he states he saw dentist yesterday who wanted to pull his molar but states they 

did not feel comfortable with them.  Patient states his been on 2 rounds of 

antibiotics states most recent and bright was azithromycin.  He has not 

appointment with dentist on Friday.  Patient denies any difficulty swallowing 

patient states there is mild pain, pressure feeling on the right side.








- Related Data


                                Home Medications











 Medication  Instructions  Recorded  Confirmed


 


Dextroamphetamine/Amphetamine 30 mg PO QAM 11/04/17 08/05/20





[Adderall Xr]   


 


Ibuprofen [Motrin] 800 mg PO QAM 11/04/17 08/05/20


 


HYDROcodone/APAP 7.5-325MG [Norco 1 tab PO QID PRN 08/05/20 08/05/20





7.5-325]   








                                  Previous Rx's











 Medication  Instructions  Recorded


 


HYDROcodone/APAP 5-325MG [Norco 1 tab PO Q6HR PRN #10 tab 08/05/20





5-325]  


 


Clindamycin HCl 300 mg PO Q6HR #40 cap 02/24/21











                                    Allergies











Allergy/AdvReac Type Severity Reaction Status Date / Time


 


meloxicam [From Mobic] Allergy  Rash/Hives Verified 02/24/21 19:58


 


Penicillins Allergy  Anaphylaxis Verified 02/24/21 19:58














Review of Systems


ROS Statement: 


Those systems with pertinent positive or pertinent negative responses have been 

documented in the HPI.





ROS Other: All systems not noted in ROS Statement are negative.





Past Medical History


Past Medical History: Asthma, Osteoarthritis (OA)


Additional Past Medical History / Comment(s): STATES ARTHRITIS IN BACK, 

CONSTIPATION


History of Any Multi-Drug Resistant Organisms: None Reported


Past Surgical History: Cholecystectomy


Additional Past Anesthesia/Blood Transfusion Reaction / Comment(s): NO 

ANESTHESIA HX.


Past Psychological History: ADD/ADHD


Smoking Status: Current every day smoker


Past Alcohol Use History: Occasional


Past Drug Use History: Marijuana





- Past Family History


  ** Mother


Family Medical History: No Reported History





General Exam


General appearance: alert, in no apparent distress


Head exam: Present: atraumatic, normocephalic, normal inspection


Eye exam: Present: normal appearance, PERRL, EOMI.  Absent: scleral icterus, 

conjunctival injection, periorbital swelling


ENT exam: Present: mucous membranes moist, TM's normal bilaterally, normal 

external ear exam, other (Right mandibular swelling and mild trismus no 

tripoding).  Absent: normal exam (Dental Tere #32), normal oropharynx


Neck exam: Present: normal inspection, full ROM.  Absent: tenderness, 

meningismus, lymphadenopathy


Respiratory exam: Present: normal lung sounds bilaterally.  Absent: respiratory 

distress, wheezes, rales, rhonchi, stridor


Cardiovascular Exam: Present: regular rate, normal rhythm, normal heart sounds. 

Absent: systolic murmur, diastolic murmur, rubs, gallop, clicks


GI/Abdominal exam: Present: soft, normal bowel sounds.  Absent: distended, 

tenderness, guarding, rebound, rigid





Course


                                   Vital Signs











  02/24/21





  19:51


 


Temperature 98.3 F


 


Pulse Rate 108 H


 


Respiratory 20





Rate 


 


Blood Pressure 150/97


 


O2 Sat by Pulse 97





Oximetry 














Medical Decision Making





- Medical Decision Making





35 over presented for facial swelling.  Patient has dental infection.  Patient 

was given clindamycin will be discharged on clindamycin.  Patient has a follow-

up appointment on Friday.  Patient will be discharged stable condition.





Disposition


Clinical Impression: 


 Dental infection, Pain, dental





Disposition: HOME SELF-CARE


Condition: Stable


Instructions (If sedation given, give patient instructions):  Toothache (ED)


Additional Instructions: 


Please return to the Emergency Department if symptoms worsen or any other 

concerns.


Prescriptions: 


Clindamycin HCl 300 mg PO Q6HR #40 cap


Is patient prescribed a controlled substance at d/c from ED?: No


Referrals: 


Nyla Rudd DO [Primary Care Provider] - 1-2 days


Time of Disposition: 19:58

## 2021-02-25 ENCOUNTER — HOSPITAL ENCOUNTER (EMERGENCY)
Dept: HOSPITAL 47 - EC | Age: 26
Discharge: HOME | End: 2021-02-25
Payer: COMMERCIAL

## 2021-02-25 VITALS — SYSTOLIC BLOOD PRESSURE: 130 MMHG | DIASTOLIC BLOOD PRESSURE: 83 MMHG | HEART RATE: 98 BPM | RESPIRATION RATE: 18 BRPM

## 2021-02-25 VITALS — TEMPERATURE: 99.6 F

## 2021-02-25 DIAGNOSIS — Z79.899: ICD-10-CM

## 2021-02-25 DIAGNOSIS — F17.200: ICD-10-CM

## 2021-02-25 DIAGNOSIS — F90.9: ICD-10-CM

## 2021-02-25 DIAGNOSIS — Z88.0: ICD-10-CM

## 2021-02-25 DIAGNOSIS — Z88.6: ICD-10-CM

## 2021-02-25 DIAGNOSIS — K04.7: Primary | ICD-10-CM

## 2021-02-25 LAB
ALBUMIN SERPL-MCNC: 4.9 G/DL (ref 3.5–5)
ALP SERPL-CCNC: 111 U/L (ref 38–126)
ALT SERPL-CCNC: 105 U/L (ref 4–49)
ANION GAP SERPL CALC-SCNC: 12 MMOL/L
AST SERPL-CCNC: 50 U/L (ref 17–59)
BASOPHILS # BLD AUTO: 0 K/UL (ref 0–0.2)
BASOPHILS NFR BLD AUTO: 0 %
BUN SERPL-SCNC: 17 MG/DL (ref 9–20)
CALCIUM SPEC-MCNC: 9.8 MG/DL (ref 8.4–10.2)
CHLORIDE SERPL-SCNC: 103 MMOL/L (ref 98–107)
CO2 SERPL-SCNC: 23 MMOL/L (ref 22–30)
EOSINOPHIL # BLD AUTO: 0.2 K/UL (ref 0–0.7)
EOSINOPHIL NFR BLD AUTO: 1 %
ERYTHROCYTE [DISTWIDTH] IN BLOOD BY AUTOMATED COUNT: 5.19 M/UL (ref 4.3–5.9)
ERYTHROCYTE [DISTWIDTH] IN BLOOD: 12.8 % (ref 11.5–15.5)
GLUCOSE SERPL-MCNC: 121 MG/DL (ref 74–99)
HCT VFR BLD AUTO: 44.7 % (ref 39–53)
HGB BLD-MCNC: 15.5 GM/DL (ref 13–17.5)
LYMPHOCYTES # SPEC AUTO: 2.1 K/UL (ref 1–4.8)
LYMPHOCYTES NFR SPEC AUTO: 18 %
MAGNESIUM SPEC-SCNC: 1.8 MG/DL (ref 1.6–2.3)
MCH RBC QN AUTO: 29.9 PG (ref 25–35)
MCHC RBC AUTO-ENTMCNC: 34.7 G/DL (ref 31–37)
MCV RBC AUTO: 86.1 FL (ref 80–100)
MONOCYTES # BLD AUTO: 0.6 K/UL (ref 0–1)
MONOCYTES NFR BLD AUTO: 5 %
NEUTROPHILS # BLD AUTO: 8.4 K/UL (ref 1.3–7.7)
NEUTROPHILS NFR BLD AUTO: 74 %
PLATELET # BLD AUTO: 304 K/UL (ref 150–450)
POTASSIUM SERPL-SCNC: 4.6 MMOL/L (ref 3.5–5.1)
PROT SERPL-MCNC: 8.1 G/DL (ref 6.3–8.2)
SODIUM SERPL-SCNC: 138 MMOL/L (ref 137–145)
WBC # BLD AUTO: 11.2 K/UL (ref 3.8–10.6)

## 2021-02-25 PROCEDURE — 96375 TX/PRO/DX INJ NEW DRUG ADDON: CPT

## 2021-02-25 PROCEDURE — 96361 HYDRATE IV INFUSION ADD-ON: CPT

## 2021-02-25 PROCEDURE — 83735 ASSAY OF MAGNESIUM: CPT

## 2021-02-25 PROCEDURE — 85025 COMPLETE CBC W/AUTO DIFF WBC: CPT

## 2021-02-25 PROCEDURE — 70491 CT SOFT TISSUE NECK W/DYE: CPT

## 2021-02-25 PROCEDURE — 84100 ASSAY OF PHOSPHORUS: CPT

## 2021-02-25 PROCEDURE — 80053 COMPREHEN METABOLIC PANEL: CPT

## 2021-02-25 PROCEDURE — 36415 COLL VENOUS BLD VENIPUNCTURE: CPT

## 2021-02-25 PROCEDURE — 96365 THER/PROPH/DIAG IV INF INIT: CPT

## 2021-02-25 PROCEDURE — 99284 EMERGENCY DEPT VISIT MOD MDM: CPT

## 2021-02-25 NOTE — ED
Recheck HPI





- General


Chief Complaint: Dental/Oral


Stated Complaint: Dental Pain


Time Seen by Provider: 02/25/21 03:11


Source: patient, family, RN notes reviewed, old records reviewed


Mode of arrival: ambulatory


Limitations: no limitations





- History of Present Illness


Initial Comments: 





This is a 25-year-old male DF for evaluation of dental pain.  Patient has 

history of dental caries seen in ER prior for evaluation.  Patient concern for 

recent development of significant swelling of the right side of his face.  No 

shortness of breath no other complaints no fever patient does have point with 

dentist for tooth removal


MD Complaint: other (Significant swelling or face was started tonight)


-: days(s)


Returns Today for: persistent/worsening pain related to initial visit


Symptoms Since Prior Visit: worsening pain (Worsening swelling)


Context: other (Evaluation for significant swelling of recent diagnosis tooth 

infection)


Associated Symptoms: none


Treatments Prior to Arrival: other medications, Given Antibiotics on





- Related Data


                                Home Medications











 Medication  Instructions  Recorded  Confirmed


 


Dextroamphetamine/Amphetamine 30 mg PO DAILY 03/03/21 03/03/21





[Dextroamphetamine/Amphetamine ER   





30 mg Cap]   








                                  Previous Rx's











 Medication  Instructions  Recorded


 


cefTRIAXone [Rocephin] 2 gm IVPB Q24H #10 bag 03/03/21


 


metroNIDAZOLE [Flagyl] 500 mg PO TID #30 tab 03/03/21


 


HYDROcodone/APAP 7.5-325MG [Norco 2 each PO Q6H PRN #12 tab 03/04/21





7.5-325]  


 


Ibuprofen [Motrin] 800 mg PO TID PRN #30 tab 03/04/21











                                    Allergies











Allergy/AdvReac Type Severity Reaction Status Date / Time


 


meloxicam [From Mobic] Allergy  Rash/Hives Verified 02/26/21 18:23


 


Penicillins Allergy  Anaphylaxis Verified 02/26/21 18:23














Review of Systems


ROS Statement: 


Those systems with pertinent positive or pertinent negative responses have been 

documented in the HPI.





ROS Other: All systems not noted in ROS Statement are negative.





Past Medical History


Past Medical History: Asthma, Osteoarthritis (OA)


Additional Past Medical History / Comment(s): STATES ARTHRITIS IN BACK, 

CONSTIPATION


History of Any Multi-Drug Resistant Organisms: None Reported


Past Surgical History: Cholecystectomy


Additional Past Anesthesia/Blood Transfusion Reaction / Comment(s): NO 

ANESTHESIA HX.


Past Psychological History: ADD/ADHD


Smoking Status: Current every day smoker


Past Alcohol Use History: Occasional


Past Drug Use History: Marijuana





- Past Family History


  ** Mother


Family Medical History: No Reported History





General Exam


Limitations: no limitations


General appearance: alert, in no apparent distress


Head exam: Present: atraumatic, normocephalic, normal inspection, other (Patient

does have significant swelling of the right side)


Eye exam: Present: normal appearance, PERRL, EOMI.  Absent: scleral icterus, 

conjunctival injection, periorbital swelling


ENT exam: Present: normal exam, mucous membranes moist


Neck exam: Present: normal inspection.  Absent: tenderness, meningismus, lym

phadenopathy


Respiratory exam: Present: normal lung sounds bilaterally.  Absent: respiratory 

distress, wheezes, rales, rhonchi, stridor


Cardiovascular Exam: Present: regular rate, normal rhythm, normal heart sounds. 

Absent: systolic murmur, diastolic murmur, rubs, gallop, clicks


GI/Abdominal exam: Present: soft, normal bowel sounds.  Absent: distended, 

tenderness, guarding, rebound, rigid


Extremities exam: Present: normal inspection, full ROM, normal capillary refill.

 Absent: tenderness, pedal edema, joint swelling, calf tenderness


Back exam: Present: normal inspection


Neurological exam: Present: alert, oriented X3, CN II-XII intact


Psychiatric exam: Present: normal affect, normal mood


Skin exam: Present: warm, dry, intact, normal color.  Absent: rash





Course


                                   Vital Signs











  02/25/21 02/25/21





  02:58 05:30


 


Temperature 99.6 F 


 


Pulse Rate 111 H 98


 


Respiratory 24 18





Rate  


 


Blood Pressure 132/85 130/83


 


O2 Sat by Pulse 97 97





Oximetry  














- Reevaluation(s)


Reevaluation #1: 





Medical record is reviewed





Patient has improved symptoms here in the ER





Patient feels better





Patient family informed results questions are answered





Patient feels good for discharge











Medical Decision Making





- Medical Decision Making





Lasix male history of recurrent or worsening dental abscess.  Patient continue 

follow-up, continue antibiotics and symptom management.





- Lab Data


Result diagrams: 


                                 02/25/21 03:50





                                 02/25/21 03:50


                                   Lab Results











  02/25/21 02/25/21 Range/Units





  03:50 03:50 


 


WBC  11.2 H   (3.8-10.6)  k/uL


 


RBC  5.19   (4.30-5.90)  m/uL


 


Hgb  15.5   (13.0-17.5)  gm/dL


 


Hct  44.7   (39.0-53.0)  %


 


MCV  86.1   (80.0-100.0)  fL


 


MCH  29.9   (25.0-35.0)  pg


 


MCHC  34.7   (31.0-37.0)  g/dL


 


RDW  12.8   (11.5-15.5)  %


 


Plt Count  304   (150-450)  k/uL


 


MPV  7.6   


 


Neutrophils %  74   %


 


Lymphocytes %  18   %


 


Monocytes %  5   %


 


Eosinophils %  1   %


 


Basophils %  0   %


 


Neutrophils #  8.4 H   (1.3-7.7)  k/uL


 


Lymphocytes #  2.1   (1.0-4.8)  k/uL


 


Monocytes #  0.6   (0-1.0)  k/uL


 


Eosinophils #  0.2   (0-0.7)  k/uL


 


Basophils #  0.0   (0-0.2)  k/uL


 


Sodium   138  (137-145)  mmol/L


 


Potassium   4.6  (3.5-5.1)  mmol/L


 


Chloride   103  ()  mmol/L


 


Carbon Dioxide   23  (22-30)  mmol/L


 


Anion Gap   12  mmol/L


 


BUN   17  (9-20)  mg/dL


 


Creatinine   0.75  (0.66-1.25)  mg/dL


 


Est GFR (CKD-EPI)AfAm   >90  (>60 ml/min/1.73 sqM)  


 


Est GFR (CKD-EPI)NonAf   >90  (>60 ml/min/1.73 sqM)  


 


Glucose   121 H  (74-99)  mg/dL


 


Calcium   9.8  (8.4-10.2)  mg/dL


 


Phosphorus   3.2  (2.5-4.5)  mg/dL


 


Magnesium   1.8  (1.6-2.3)  mg/dL


 


Total Bilirubin   0.8  (0.2-1.3)  mg/dL


 


AST   50  (17-59)  U/L


 


ALT   105 H  (4-49)  U/L


 


Alkaline Phosphatase   111  ()  U/L


 


Total Protein   8.1  (6.3-8.2)  g/dL


 


Albumin   4.9  (3.5-5.0)  g/dL














- Radiology Data


Radiology results: report reviewed (CT does show dental abscess with surrounding

inflammation), image reviewed





Disposition


Clinical Impression: 


 Dental infection, Pain, dental, Dental abscess





Disposition: HOME SELF-CARE


Condition: Good


Instructions (If sedation given, give patient instructions):  Dental Abscess 

(ED), Toothache (ED)


Is patient prescribed a controlled substance at d/c from ED?: No


Referrals: 


Nyla Rudd DO [Primary Care Provider] - 1-2 days

## 2021-02-25 NOTE — CT
EXAM:

  CT Neck With Intravenous Contrast

 

CLINICAL HISTORY:

  ITS.REASON CT Reason: abscess

 

TECHNIQUE:

  Axial computed tomography images of the neck with intravenous contrast. 

 CTDI is 19.441 mGy and DLP is 712 mGy-cm.  This CT exam was performed 

using one or more of the following dose reduction techniques: automated 

exposure control, adjustment of the mA and/or kV according to patient 

size, and/or use of iterative reconstruction technique.

 

COMPARISON:

  No relevant prior studies available.

 

FINDINGS:

  Oropharynx:  No significant tonsillar enlargement.  No peritonsillar 

abscess.

  Hypopharynx:  Unremarkable.

  Larynx:  Normal epiglottis.

  Trachea:  Unremarkable.

  Retropharyngeal space:  Unremarkable.

  Submandibular/parotid glands:  Glands are normal in size.

  Thyroid:  No nodules.

  Bones/joints:  No acute fracture.  Periapical lucency involving the 

right maxillary canine.

  Soft tissues: Significant soft tissue swelling in the submental and 

right more than left submandibular region and right lower face. This 

process extends into the lower deep neck space to the level of the 

thyroid glands. 

  Vasculature:  Unremarkable.

  Lymph nodes: Enlarged lymph nodes.

  Sinuses:  Unremarkable.  No acute sinusitis.

  Mastoid air cells:  Unremarkable.  No mastoid effusion.

  Lung apices:  Unremarkable.

  Pulmonary arteries dilated to 3.2 cm.

 

IMPRESSION:     

  

1.  Significant inflammation and swelling in the right lower face/neck 

involving the submental/right more than left submandibular regions.  This 

process extends into the lower deep neck space to the level of the 

thyroid glands.  Likely infectious process.  No abscess.  No airway 

compromise.

 

2.  Dilated pulmonary artery, correlate with pulmonary arterial 

hypertension.

 

3.  Odontogenic disease involving the right maxillary canine.  No 

associated abscess.

<MYCVCSECTION>

 

Communications:

 

02/25/21 04:52 Call Doctor Regarding Above results, called  Dr. Morgan 

on 02/25 04:52 (-05:00)

## 2021-02-26 ENCOUNTER — HOSPITAL ENCOUNTER (INPATIENT)
Dept: HOSPITAL 47 - EC | Age: 26
LOS: 6 days | Discharge: HOME HEALTH | DRG: 872 | End: 2021-03-04
Attending: INTERNAL MEDICINE | Admitting: INTERNAL MEDICINE
Payer: COMMERCIAL

## 2021-02-26 DIAGNOSIS — J45.909: ICD-10-CM

## 2021-02-26 DIAGNOSIS — Z90.49: ICD-10-CM

## 2021-02-26 DIAGNOSIS — F90.9: ICD-10-CM

## 2021-02-26 DIAGNOSIS — L03.211: ICD-10-CM

## 2021-02-26 DIAGNOSIS — A41.4: Primary | ICD-10-CM

## 2021-02-26 DIAGNOSIS — Z87.19: ICD-10-CM

## 2021-02-26 DIAGNOSIS — Z88.6: ICD-10-CM

## 2021-02-26 DIAGNOSIS — L02.01: ICD-10-CM

## 2021-02-26 DIAGNOSIS — Z20.822: ICD-10-CM

## 2021-02-26 DIAGNOSIS — S02.5XXA: ICD-10-CM

## 2021-02-26 DIAGNOSIS — K02.9: ICD-10-CM

## 2021-02-26 DIAGNOSIS — Z88.0: ICD-10-CM

## 2021-02-26 DIAGNOSIS — M47.9: ICD-10-CM

## 2021-02-26 DIAGNOSIS — K04.7: ICD-10-CM

## 2021-02-26 DIAGNOSIS — K59.00: ICD-10-CM

## 2021-02-26 DIAGNOSIS — K12.2: ICD-10-CM

## 2021-02-26 DIAGNOSIS — F98.8: ICD-10-CM

## 2021-02-26 DIAGNOSIS — Z98.890: ICD-10-CM

## 2021-02-26 DIAGNOSIS — Z71.6: ICD-10-CM

## 2021-02-26 DIAGNOSIS — Z79.899: ICD-10-CM

## 2021-02-26 DIAGNOSIS — F17.210: ICD-10-CM

## 2021-02-26 LAB
ALBUMIN SERPL-MCNC: 4.7 G/DL (ref 3.5–5)
ALP SERPL-CCNC: 102 U/L (ref 38–126)
ALT SERPL-CCNC: 252 U/L (ref 4–49)
ANION GAP SERPL CALC-SCNC: 11 MMOL/L
AST SERPL-CCNC: 157 U/L (ref 17–59)
BASOPHILS # BLD AUTO: 0.1 K/UL (ref 0–0.2)
BASOPHILS NFR BLD AUTO: 1 %
BUN SERPL-SCNC: 13 MG/DL (ref 9–20)
CALCIUM SPEC-MCNC: 9.4 MG/DL (ref 8.4–10.2)
CHLORIDE SERPL-SCNC: 104 MMOL/L (ref 98–107)
CO2 SERPL-SCNC: 24 MMOL/L (ref 22–30)
EOSINOPHIL # BLD AUTO: 0.2 K/UL (ref 0–0.7)
EOSINOPHIL NFR BLD AUTO: 2 %
ERYTHROCYTE [DISTWIDTH] IN BLOOD BY AUTOMATED COUNT: 5.13 M/UL (ref 4.3–5.9)
ERYTHROCYTE [DISTWIDTH] IN BLOOD: 12.9 % (ref 11.5–15.5)
GLUCOSE SERPL-MCNC: 111 MG/DL (ref 74–99)
HCT VFR BLD AUTO: 44.8 % (ref 39–53)
HGB BLD-MCNC: 14.7 GM/DL (ref 13–17.5)
LYMPHOCYTES # SPEC AUTO: 2.4 K/UL (ref 1–4.8)
LYMPHOCYTES NFR SPEC AUTO: 19 %
MCH RBC QN AUTO: 28.7 PG (ref 25–35)
MCHC RBC AUTO-ENTMCNC: 32.9 G/DL (ref 31–37)
MCV RBC AUTO: 87.3 FL (ref 80–100)
MONOCYTES # BLD AUTO: 0.7 K/UL (ref 0–1)
MONOCYTES NFR BLD AUTO: 6 %
NEUTROPHILS # BLD AUTO: 9 K/UL (ref 1.3–7.7)
NEUTROPHILS NFR BLD AUTO: 73 %
PLATELET # BLD AUTO: 299 K/UL (ref 150–450)
POTASSIUM SERPL-SCNC: 4.9 MMOL/L (ref 3.5–5.1)
PROT SERPL-MCNC: 7.9 G/DL (ref 6.3–8.2)
SODIUM SERPL-SCNC: 139 MMOL/L (ref 137–145)
WBC # BLD AUTO: 12.5 K/UL (ref 3.8–10.6)

## 2021-02-26 PROCEDURE — 36415 COLL VENOUS BLD VENIPUNCTURE: CPT

## 2021-02-26 PROCEDURE — 87040 BLOOD CULTURE FOR BACTERIA: CPT

## 2021-02-26 PROCEDURE — 87070 CULTURE OTHR SPECIMN AEROBIC: CPT

## 2021-02-26 PROCEDURE — 87635 SARS-COV-2 COVID-19 AMP PRB: CPT

## 2021-02-26 PROCEDURE — 86140 C-REACTIVE PROTEIN: CPT

## 2021-02-26 PROCEDURE — 96361 HYDRATE IV INFUSION ADD-ON: CPT

## 2021-02-26 PROCEDURE — 87205 SMEAR GRAM STAIN: CPT

## 2021-02-26 PROCEDURE — 96365 THER/PROPH/DIAG IV INF INIT: CPT

## 2021-02-26 PROCEDURE — 85027 COMPLETE CBC AUTOMATED: CPT

## 2021-02-26 PROCEDURE — 87075 CULTR BACTERIA EXCEPT BLOOD: CPT

## 2021-02-26 PROCEDURE — 99285 EMERGENCY DEPT VISIT HI MDM: CPT

## 2021-02-26 PROCEDURE — 83605 ASSAY OF LACTIC ACID: CPT

## 2021-02-26 PROCEDURE — 70487 CT MAXILLOFACIAL W/DYE: CPT

## 2021-02-26 PROCEDURE — 36410 VNPNXR 3YR/> PHY/QHP DX/THER: CPT

## 2021-02-26 PROCEDURE — 76937 US GUIDE VASCULAR ACCESS: CPT

## 2021-02-26 PROCEDURE — 96376 TX/PRO/DX INJ SAME DRUG ADON: CPT

## 2021-02-26 PROCEDURE — 80048 BASIC METABOLIC PNL TOTAL CA: CPT

## 2021-02-26 PROCEDURE — 96375 TX/PRO/DX INJ NEW DRUG ADDON: CPT

## 2021-02-26 PROCEDURE — 85025 COMPLETE CBC W/AUTO DIFF WBC: CPT

## 2021-02-26 PROCEDURE — 80053 COMPREHEN METABOLIC PANEL: CPT

## 2021-02-26 RX ADMIN — CEFTRIAXONE SODIUM SCH MG: 1 INJECTION, POWDER, FOR SOLUTION INTRAMUSCULAR; INTRAVENOUS at 19:25

## 2021-02-26 RX ADMIN — CEFTRIAXONE SODIUM SCH MG: 1 INJECTION, POWDER, FOR SOLUTION INTRAMUSCULAR; INTRAVENOUS at 19:19

## 2021-02-26 NOTE — ED
General Adult HPI





- General


Chief complaint: Dental/Oral


Stated complaint: Tooth abcess, swollen mouth


Time Seen by Provider: 02/26/21 18:20


Source: patient, RN notes reviewed, old records reviewed


Mode of arrival: ambulatory


Limitations: no limitations





- History of Present Illness


Initial comments: 





This is a 25 her old male comes in complaining of facial swelling on the right 

side of his face under his jaw.  Patient states it started approximate month ago

when he broke molar.  Patient states he been in and out of his tetanus office 

they recently to start him on clindamycin on Wednesday but the symptoms have 

worsened.  Patient states is difficult for him to open up his mouth fully 

anymore and the pain on the jaw line and underneath the jaw on the right is 

significant.  Patient denies any fevers.  Patient denies any chest pain 

difficult breathing shortness of breath.  Patient denies any difficulty 

swallowing.





- Related Data


                                Home Medications











 Medication  Instructions  Recorded  Confirmed


 


Dextroamphetamine/Amphetamine 30 mg PO QAM 11/04/17 08/05/20





[Adderall Xr]   


 


Ibuprofen [Motrin] 800 mg PO QAM 11/04/17 08/05/20


 


HYDROcodone/APAP 7.5-325MG [Norco 1 tab PO QID PRN 08/05/20 08/05/20





7.5-325]   








                                  Previous Rx's











 Medication  Instructions  Recorded


 


HYDROcodone/APAP 5-325MG [Norco 1 tab PO Q6HR PRN #10 tab 08/05/20





5-325]  


 


Clindamycin HCl 300 mg PO Q6HR #40 cap 02/24/21











                                    Allergies











Allergy/AdvReac Type Severity Reaction Status Date / Time


 


meloxicam [From Mobic] Allergy  Rash/Hives Verified 02/26/21 18:23


 


Penicillins Allergy  Anaphylaxis Verified 02/26/21 18:23














Review of Systems


ROS Statement: 


Those systems with pertinent positive or pertinent negative responses have been 

documented in the HPI.





ROS Other: All systems not noted in ROS Statement are negative.





Past Medical History


Past Medical History: Asthma, Osteoarthritis (OA)


Additional Past Medical History / Comment(s): STATES ARTHRITIS IN BACK, 

CONSTIPATION


History of Any Multi-Drug Resistant Organisms: None Reported


Past Surgical History: Cholecystectomy


Additional Past Anesthesia/Blood Transfusion Reaction / Comment(s): NO 

ANESTHESIA HX.


Past Psychological History: ADD/ADHD


Smoking Status: Current every day smoker


Past Alcohol Use History: Occasional


Past Drug Use History: Marijuana





- Past Family History


  ** Mother


Family Medical History: No Reported History





General Exam





- General Exam Comments


Initial Comments: 





GENERAL:


Patient is well-developed and well-nourished.  Patient is nontoxic and well-

hydrated and is in moderate distress.





ENT:


Patient has significant right side of his face swelling in the cheek and very 

tense swelling under the jaw particularly the right aspect of the submandibular 

space.  Patient is only able to open his mouth such that his teeth are 

approximately 2 cm apart. 





EYES:


The sclera were anicteric and conjunctiva were pink and moist.  Extraocular 

movements were intact and pupils were equal round and reactive to light.  

Eyelids were unremarkable.





PULMONARY:


Unlabored respirations.  Good breath sounds bilaterally.  No audible rales 

rhonchi or wheezing was noted.





CARDIOVASCULAR:


There is a regular rate and rhythm without any murmurs gallops or rubs. 





ABDOMEN:


Soft and nontender with normal bowel sounds. 





SKIN:


Skin is clear with no lesions or rashes and otherwise unremarkable.





NEUROLOGIC:


Patient is alert and oriented x3.  Cranial nerves II through XII are grossly 

intact.  Motor and sensory are also intact.  Normal speech, volume and content. 

Symmetrical smile. 





MUSCULOSKELETAL:


Normal extremities with adequate strength and full range of motion.  





LYMPHATICS:


No significant lymphadenopathy is noted





PSYCHIATRIC:


Normal psychiatric evaluation.  


Limitations: no limitations





Course


                                   Vital Signs











  02/26/21 02/26/21





  18:20 20:01


 


Temperature 98.7 F 99.2 F


 


Pulse Rate 101 H 96


 


Respiratory 22 18





Rate  


 


Blood Pressure 105/102 135/81


 


O2 Sat by Pulse 99 96





Oximetry  














Medical Decision Making





- Medical Decision Making





computed tomography scan shows a submandibular abscess with surrounding 

inflammation.





I started the patient on 2 g Rocephin and clindamycin.





- Lab Data


Result diagrams: 


                                 02/26/21 19:09





                                 02/26/21 19:09


                                   Lab Results











  02/26/21 02/26/21 02/26/21 Range/Units





  19:09 19:09 19:09 


 


WBC  12.5 H    (3.8-10.6)  k/uL


 


RBC  5.13    (4.30-5.90)  m/uL


 


Hgb  14.7    (13.0-17.5)  gm/dL


 


Hct  44.8    (39.0-53.0)  %


 


MCV  87.3    (80.0-100.0)  fL


 


MCH  28.7    (25.0-35.0)  pg


 


MCHC  32.9    (31.0-37.0)  g/dL


 


RDW  12.9    (11.5-15.5)  %


 


Plt Count  299    (150-450)  k/uL


 


MPV  7.8    


 


Neutrophils %  73    %


 


Lymphocytes %  19    %


 


Monocytes %  6    %


 


Eosinophils %  2    %


 


Basophils %  1    %


 


Neutrophils #  9.0 H    (1.3-7.7)  k/uL


 


Lymphocytes #  2.4    (1.0-4.8)  k/uL


 


Monocytes #  0.7    (0-1.0)  k/uL


 


Eosinophils #  0.2    (0-0.7)  k/uL


 


Basophils #  0.1    (0-0.2)  k/uL


 


Sodium   139   (137-145)  mmol/L


 


Potassium   4.9   (3.5-5.1)  mmol/L


 


Chloride   104   ()  mmol/L


 


Carbon Dioxide   24   (22-30)  mmol/L


 


Anion Gap   11   mmol/L


 


BUN   13   (9-20)  mg/dL


 


Creatinine   0.61 L   (0.66-1.25)  mg/dL


 


Est GFR (CKD-EPI)AfAm   >90   (>60 ml/min/1.73 sqM)  


 


Est GFR (CKD-EPI)NonAf   >90   (>60 ml/min/1.73 sqM)  


 


Glucose   111 H   (74-99)  mg/dL


 


Plasma Lactic Acid Santosh    1.9  (0.7-2.0)  mmol/L


 


Calcium   9.4   (8.4-10.2)  mg/dL


 


Total Bilirubin   0.9   (0.2-1.3)  mg/dL


 


AST   157 H   (17-59)  U/L


 


ALT   252 H   (4-49)  U/L


 


Alkaline Phosphatase   102   ()  U/L


 


Total Protein   7.9   (6.3-8.2)  g/dL


 


Albumin   4.7   (3.5-5.0)  g/dL














Disposition


Clinical Impression: 


 Submandibular abscess





Disposition: ADMITTED AS IP TO THIS HOSP


Referrals: 


Nyla Rudd DO [Primary Care Provider] - 1-2 days


Time of Disposition: 20:52

## 2021-02-26 NOTE — CT
EXAMINATION TYPE: CT facial bones w con

 

DATE OF EXAM: 2/26/2021

 

COMPARISON: CT neck soft tissues 2/25/2021.

 

HISTORY: Right sided facial swelling and pain.

 

CT DLP: 893.2 mGycm

Automated exposure control for dose reduction was used.

 

CONTRAST: 

CT scan of the facial bones is performed with IV Contrast, patient injected with 100ml mL of Isovue 3
00.

 

TECHNIQUE: CT scan of the facial bones is performed following the administration of intravenous contr
ast, axial images are obtained, coronal reformatted images are also reviewed.

 

FINDINGS: There is redemonstration of diffuse moderate to marked soft tissue fat stranding centered a
bout the mandible. There is involvement of the superficial and deep soft tissues. There is mildly enh
ancing confluent edema, difficult to measure but approximately 2.1 x 1.2 cm about the anterior right 
mandible body. Dental caries are noted. No soft tissue gas. There is mild local mass effect. Visualiz
ed airways remain patent.

 

There are multiple mildly enlarged level 1/2 cervical lymph nodes, likely reactive. The visualized patel
bmandibular glands are otherwise intact. No acute osseous abnormality. There is mild mucosal thickeni
ng of the ethmoid sinus. The mastoid air cells are adequately aerated.

 

 

 IMPRESSION: 

 

Redemonstrated diffuse inflammatory about the right mandibular soft tissues with area of confluent ed
ema measuring at least 2.1 cm and highly suggestive of phlegmon/early odontogenic abscess.

## 2021-02-27 LAB
ANION GAP SERPL CALC-SCNC: 9.7 MMOL/L (ref 4–12)
BASOPHILS # BLD AUTO: 0.03 X 10*3/UL (ref 0–0.1)
BASOPHILS NFR BLD AUTO: 0.2 %
BUN SERPL-SCNC: 11 MG/DL (ref 9–27)
BUN/CREAT SERPL: 13.75 RATIO (ref 12–20)
CALCIUM SPEC-MCNC: 9.5 MG/DL (ref 8.7–10.3)
CHLORIDE SERPL-SCNC: 102 MMOL/L (ref 96–109)
CO2 SERPL-SCNC: 23.3 MMOL/L (ref 21.6–31.8)
EOSINOPHIL # BLD AUTO: 0.05 X 10*3/UL (ref 0.04–0.35)
EOSINOPHIL NFR BLD AUTO: 0.3 %
ERYTHROCYTE [DISTWIDTH] IN BLOOD BY AUTOMATED COUNT: 4.73 X 10*6/UL (ref 4.4–5.6)
ERYTHROCYTE [DISTWIDTH] IN BLOOD: 13.2 % (ref 11.5–14.5)
GLUCOSE SERPL-MCNC: 96 MG/DL (ref 70–110)
HCT VFR BLD AUTO: 41.2 % (ref 39.6–50)
HGB BLD-MCNC: 13.7 G/DL (ref 13–17)
LYMPHOCYTES # SPEC AUTO: 1.98 X 10*3/UL (ref 0.9–5)
LYMPHOCYTES NFR SPEC AUTO: 13.6 %
MCH RBC QN AUTO: 29 PG (ref 27–32)
MCHC RBC AUTO-ENTMCNC: 33.3 G/DL (ref 32–37)
MCV RBC AUTO: 87.1 FL (ref 80–97)
MONOCYTES # BLD AUTO: 1.3 X 10*3/UL (ref 0.2–1)
MONOCYTES NFR BLD AUTO: 8.9 %
NEUTROPHILS # BLD AUTO: 11.11 X 10*3/UL (ref 1.8–7.7)
NEUTROPHILS NFR BLD AUTO: 76.5 %
PLATELET # BLD AUTO: 299 X 10*3/UL (ref 140–440)
POTASSIUM SERPL-SCNC: 4.3 MMOL/L (ref 3.5–5.5)
SODIUM SERPL-SCNC: 135 MMOL/L (ref 135–145)
WBC # BLD AUTO: 14.54 X 10*3/UL (ref 4.5–10)

## 2021-02-27 RX ADMIN — NICOTINE SCH PATCH: 21 PATCH, EXTENDED RELEASE TRANSDERMAL at 08:56

## 2021-02-27 RX ADMIN — HEPARIN SODIUM SCH UNIT: 5000 INJECTION, SOLUTION INTRAVENOUS; SUBCUTANEOUS at 08:55

## 2021-02-27 RX ADMIN — HYDROMORPHONE HYDROCHLORIDE PRN MG: 1 INJECTION, SOLUTION INTRAMUSCULAR; INTRAVENOUS; SUBCUTANEOUS at 12:40

## 2021-02-27 RX ADMIN — KETOROLAC TROMETHAMINE SCH: 15 INJECTION, SOLUTION INTRAMUSCULAR; INTRAVENOUS at 17:54

## 2021-02-27 RX ADMIN — ACETAMINOPHEN PRN MG: 325 TABLET, FILM COATED ORAL at 00:35

## 2021-02-27 RX ADMIN — CLINDAMYCIN PHOSPHATE SCH MLS/HR: 150 INJECTION, SOLUTION INTRAVENOUS at 22:58

## 2021-02-27 RX ADMIN — ACETAMINOPHEN PRN MG: 325 TABLET, FILM COATED ORAL at 07:28

## 2021-02-27 RX ADMIN — CEFAZOLIN SCH MLS/HR: 330 INJECTION, POWDER, FOR SOLUTION INTRAMUSCULAR; INTRAVENOUS at 10:19

## 2021-02-27 RX ADMIN — NICOTINE SCH PATCH: 21 PATCH, EXTENDED RELEASE TRANSDERMAL at 00:36

## 2021-02-27 RX ADMIN — CLINDAMYCIN PHOSPHATE SCH MLS/HR: 150 INJECTION, SOLUTION INTRAVENOUS at 07:29

## 2021-02-27 RX ADMIN — KETOROLAC TROMETHAMINE SCH MG: 15 INJECTION, SOLUTION INTRAMUSCULAR; INTRAVENOUS at 22:58

## 2021-02-27 RX ADMIN — FAMOTIDINE SCH MG: 10 INJECTION, SOLUTION INTRAVENOUS at 08:56

## 2021-02-27 RX ADMIN — HYDROMORPHONE HYDROCHLORIDE PRN MG: 1 INJECTION, SOLUTION INTRAMUSCULAR; INTRAVENOUS; SUBCUTANEOUS at 05:06

## 2021-02-27 RX ADMIN — CLINDAMYCIN PHOSPHATE SCH MLS/HR: 150 INJECTION, SOLUTION INTRAVENOUS at 00:11

## 2021-02-27 RX ADMIN — HEPARIN SODIUM SCH UNIT: 5000 INJECTION, SOLUTION INTRAVENOUS; SUBCUTANEOUS at 20:10

## 2021-02-27 RX ADMIN — CEFAZOLIN SCH: 330 INJECTION, POWDER, FOR SOLUTION INTRAMUSCULAR; INTRAVENOUS at 20:10

## 2021-02-27 RX ADMIN — Medication PRN MG: at 17:32

## 2021-02-27 RX ADMIN — FAMOTIDINE SCH MG: 10 INJECTION, SOLUTION INTRAVENOUS at 20:10

## 2021-02-27 RX ADMIN — METRONIDAZOLE SCH MLS/HR: 500 INJECTION, SOLUTION INTRAVENOUS at 17:54

## 2021-02-27 RX ADMIN — KETOROLAC TROMETHAMINE SCH MG: 15 INJECTION, SOLUTION INTRAMUSCULAR; INTRAVENOUS at 00:10

## 2021-02-27 RX ADMIN — METRONIDAZOLE SCH MLS/HR: 500 INJECTION, SOLUTION INTRAVENOUS at 22:59

## 2021-02-27 RX ADMIN — HYDROMORPHONE HYDROCHLORIDE PRN MG: 1 INJECTION, SOLUTION INTRAMUSCULAR; INTRAVENOUS; SUBCUTANEOUS at 09:03

## 2021-02-27 RX ADMIN — CLINDAMYCIN PHOSPHATE SCH: 150 INJECTION, SOLUTION INTRAVENOUS at 17:53

## 2021-02-27 RX ADMIN — KETOROLAC TROMETHAMINE SCH MG: 15 INJECTION, SOLUTION INTRAMUSCULAR; INTRAVENOUS at 05:26

## 2021-02-27 RX ADMIN — KETOROLAC TROMETHAMINE SCH MG: 15 INJECTION, SOLUTION INTRAMUSCULAR; INTRAVENOUS at 11:10

## 2021-02-27 RX ADMIN — HYDROMORPHONE HYDROCHLORIDE PRN MG: 1 INJECTION, SOLUTION INTRAMUSCULAR; INTRAVENOUS; SUBCUTANEOUS at 02:19

## 2021-02-27 NOTE — P.OP
Date of Procedure: 02/27/21


Preoperative Diagnosis: 


Cellulitis of Right face


dental abcess 


deep decay 18


Postoperative Diagnosis: 


same


Procedure(s) Performed: 


I and D x 2 right neck


intra oral I and D right vestubule


surgical extraction of tooth 18


Anesthesia: ABIMAEL


Surgeon: Jaden Ventura


Estimated Blood Loss (ml): 10


IV fluids (ml): 1,200


Urine output (ml): 0


Pathology: other (anerobic and aerobic cultures and sensitivity)


Condition: stable


Disposition: floor


Indications for Procedure: 


Patient had failed outpatient by mouth antibiotic therapy and was admitted day 

ago on IV antibiotic therapy and seemed to be getting more swollen.








Operative Findings: 


foul odor to the exudate


Description of Procedure: 


Patient was intubated with fiberoptic oral intubation per the anesthesia record.

 This went well given the circumstances but his mouth is only opened 

approximately 15-20 mm.  Patient's beard was shaved on the right side to expose 

the firm swelling of his sub-mandibular space also cellulitic swelling of the 

right buccal space and right temporal space.  Able to open the patient's mouth 

approximately 25 mm after the completion of and static intubation.  And a 

sterile field was obtained.  The neck swelling was approximately 3 cm x 6 cm the

submandibular area seemed to be a.  Fluctuance and anterior portion of this and 

2 drains were placed with 1 cm skin incisions and blunt dissection up to the 

mandibular border.  More drainage approximately 5-10 mL came from the anterior 

portion with a mix of bloody and slightly yellow exudate came from the posterior

portion of this drainage.  The decay on tooth #18 was the worse and this 

concurred with that the patient had said that tooth #18 was the one that hurt 

him for the past month.  After placing throat pack and local anesthetic 

full-thickness buccal flap with small amount of bone in the tooth was removed.  

No pus came from the socket so decision was made to place a vestibular incision 

and do incision and drainage of this area as well.  Drain position favored up 

into the temporal space and an effort to encourage drainage from that area.  

Drains were cleaned the neck and dressed appropriately and the patient's was 

then awakened and extubated taken to recovery her weight is stable condition 

awaiting transfer back up to the floor to resume all previous orders.

## 2021-02-27 NOTE — P.GSCN
History of Present Illness


Consult date: 02/27/21


Reason for Consult: 





Dental abscess.





Requesting physician: Negro E Sheet


History of present illness: 


5-year-old male presents yesterday to emergency room after 1 month of dental 

pain with increased facial swelling.  Reported having multiple courses of by 

mouth antibiotics as an outpatient and has not resolved.  Stated last night in 

observation and had continued to get worse.  Has increased pain and swelling of 

the right face but denies difficulty swallowing or breathing.








Review of Systems


As per HPI








Past Medical History


Past Medical History: Asthma, Osteoarthritis (OA)


Additional Past Medical History / Comment(s): STATES ARTHRITIS IN BACK, 

CONSTIPATION


History of Any Multi-Drug Resistant Organisms: None Reported


Past Surgical History: Cholecystectomy


Past Anesthesia/Blood Transfusion Reactions: No Reported Reaction


Additional Past Anesthesia/Blood Transfusion Reaction / Comm: NO ANESTHESIA HX.


Past Psychological History: ADD/ADHD


Smoking Status: Current every day smoker


Past Alcohol Use History: Occasional


Additional Past Alcohol Use History / Comment(s): SMOKES 1/2 PPD.  SMOKING SINCE

12 YEARS OLD.


Past Drug Use History: Marijuana





- Past Family History


  ** Mother


Family Medical History: No Reported History





Medications and Allergies


                                Home Medications











 Medication  Instructions  Recorded  Confirmed  Type


 


Ibuprofen [Motrin] 800 mg PO TID PRN 11/04/17 02/26/21 History


 


Clindamycin HCl 300 mg PO Q6HR #40 cap 02/24/21 02/26/21 Rx








                                    Allergies











Allergy/AdvReac Type Severity Reaction Status Date / Time


 


meloxicam [From Mobic] Allergy  Rash/Hives Verified 02/26/21 18:23


 


Penicillins Allergy  Anaphylaxis Verified 02/26/21 18:23














Surgical - Exam


                                   Vital Signs











Temp Pulse Resp BP Pulse Ox


 


 98.7 F   101 H  22   105/102   99 


 


 02/26/21 18:20  02/26/21 18:20  02/26/21 18:20  02/26/21 18:20  02/26/21 18:20











Facial cellulitis noted on the right side extending from the lower eyelid down 

past the angle of the mandible from the labial commissure to the external 

audatrymeatus.  Severe trismus noted with difficult intraoral exam.  most of the

swelling is toward the buccal wwith minimal lingual swelling noted. trouble 

swallowing saliva noted





able to feel sharp surface of tooth 18 with other decayed teeth noted.





patient able to open eye with help and PERRLA and EOMI 








- General


moderate pain





Results


reviewed PANORAMIC reconstruction and tooth 31 appears to have decay.  some 

abcess development noted near mandibular border on right











- Labs





                                 02/27/21 06:07





                                 02/27/21 06:07


                  Abnormal Lab Results - Last 24 Hours (Table)











  02/26/21 02/26/21 02/27/21 Range/Units





  19:09 19:09 06:07 


 


WBC  12.5 H   14.54 H  (3.8-10.6)  k/uL


 


Immature Gran #    0.07 H  (0.00-0.04)  X 10*3/uL


 


Neutrophils #  9.0 H   11.11 H  (1.3-7.7)  k/uL


 


Monocytes #    1.30 H  (0.20-1.00)  X 10*3/uL


 


Creatinine   0.61 L   (0.66-1.25)  mg/dL


 


Glucose   111 H   (74-99)  mg/dL


 


AST   157 H   (17-59)  U/L


 


ALT   252 H   (4-49)  U/L








                                 Diabetes panel











  02/26/21 02/27/21 Range/Units





  19:09 06:07 


 


Sodium  139  135  (137-145)  mmol/L


 


Potassium  4.9  4.3  (3.5-5.1)  mmol/L


 


Chloride  104  102  ()  mmol/L


 


Carbon Dioxide  24  23.3  (22-30)  mmol/L


 


BUN  13  11.0  (9-20)  mg/dL


 


Creatinine  0.61 L  0.8  (0.66-1.25)  mg/dL


 


Glucose  111 H  96  (74-99)  mg/dL


 


Calcium  9.4  9.5  (8.4-10.2)  mg/dL


 


AST  157 H   (17-59)  U/L


 


ALT  252 H   (4-49)  U/L


 


Alkaline Phosphatase  102   ()  U/L


 


Total Protein  7.9   (6.3-8.2)  g/dL


 


Albumin  4.7   (3.5-5.0)  g/dL








                                  Calcium panel











  02/26/21 02/27/21 Range/Units





  19:09 06:07 


 


Calcium  9.4  9.5  (8.4-10.2)  mg/dL


 


Albumin  4.7   (3.5-5.0)  g/dL








                                 Pituitary panel











  02/26/21 02/27/21 Range/Units





  19:09 06:07 


 


Sodium  139  135  (137-145)  mmol/L


 


Potassium  4.9  4.3  (3.5-5.1)  mmol/L


 


Chloride  104  102  ()  mmol/L


 


Carbon Dioxide  24  23.3  (22-30)  mmol/L


 


BUN  13  11.0  (9-20)  mg/dL


 


Creatinine  0.61 L  0.8  (0.66-1.25)  mg/dL


 


Glucose  111 H  96  (74-99)  mg/dL


 


Calcium  9.4  9.5  (8.4-10.2)  mg/dL








                                  Adrenal panel











  02/26/21 02/27/21 Range/Units





  19:09 06:07 


 


Sodium  139  135  (137-145)  mmol/L


 


Potassium  4.9  4.3  (3.5-5.1)  mmol/L


 


Chloride  104  102  ()  mmol/L


 


Carbon Dioxide  24  23.3  (22-30)  mmol/L


 


BUN  13  11.0  (9-20)  mg/dL


 


Creatinine  0.61 L  0.8  (0.66-1.25)  mg/dL


 


Glucose  111 H  96  (74-99)  mg/dL


 


Calcium  9.4  9.5  (8.4-10.2)  mg/dL


 


Total Bilirubin  0.9   (0.2-1.3)  mg/dL


 


AST  157 H   (17-59)  U/L


 


ALT  252 H   (4-49)  U/L


 


Alkaline Phosphatase  102   ()  U/L


 


Total Protein  7.9   (6.3-8.2)  g/dL


 


Albumin  4.7   (3.5-5.0)  g/dL














Assessment and Plan


Assessment: 





Facial Cellulitis associated with Dental Abcess


odontogenic decay


Trismus





Plan: 





Plan to take patient to OR for inscion and drainge of abbcess and removal of 

tooth.

## 2021-02-27 NOTE — P.HPIM
History of Present Illness


25-year-old male came in with facial swelling found to have submandibular 

abscess subsequently admitted for that.  Patient does have K Chau teeth let to 

significant right upper face swelling with the swelling standing up to the right

eye.  Patient up broken molar approximately a month ago patient does smoke.  

Patient was started on Rocephin and clindamycin as patient is ALLERGIC to 

penicillins.  Patient was started on Toradol as well as opiate pain medications 

for pain.  ENT and maxillofacial surgery evaluated the patient patient will 

undergo incision and drainage today along with tooth extraction.








Review of Systems








REVIEW OF SYSTEMS: 


CONSTITUTIONAL: No fever, no malaise, no fatigue. 


HEENT: As mentioned in HPI


CARDIOVASCULAR: No chest pain, orthopnea, PND, no palpitations, no syncope. 


PULMONARY: No shortness of breath, no cough, no hemoptysis. 


GASTROINTESTINAL: No diarrhea, no nausea, no vomiting, no abdominal pain. 


NEUROLOGICAL: No headaches, no weakness, no numbness. 


HEMATOLOGICAL: Denies any bleeding or petechiae. 


GENITOURINARY: Denies any burning micturition, frequency, or urgency. 


MUSCULOSKELETAL/RHEUMATOLOGICAL: Denies any joint pain, swelling, or any muscle 

pain. 


ENDOCRINE: Denies any polyuria or polydipsia. 





The rest of the 14-point review of systems is negative.











Past Medical History


Past Medical History: Asthma, Osteoarthritis (OA)


Additional Past Medical History / Comment(s): STATES ARTHRITIS IN BACK, 

CONSTIPATION


History of Any Multi-Drug Resistant Organisms: None Reported


Past Surgical History: Cholecystectomy


Past Anesthesia/Blood Transfusion Reactions: No Reported Reaction


Additional Past Anesthesia/Blood Transfusion Reaction / Comment(s): NO 

ANESTHESIA HX.


Past Psychological History: ADD/ADHD


Smoking Status: Current every day smoker


Past Alcohol Use History: Occasional


Additional Past Alcohol Use History / Comment(s): SMOKES 1/2 PPD.  SMOKING SINCE

12 YEARS OLD.


Past Drug Use History: Marijuana





- Past Family History


  ** Mother


Family Medical History: No Reported History





Medications and Allergies


                                Home Medications











 Medication  Instructions  Recorded  Confirmed  Type


 


Ibuprofen [Motrin] 800 mg PO TID PRN 11/04/17 02/26/21 History


 


Clindamycin HCl 300 mg PO Q6HR #40 cap 02/24/21 02/26/21 Rx








                                    Allergies











Allergy/AdvReac Type Severity Reaction Status Date / Time


 


meloxicam [From Mobic] Allergy  Rash/Hives Verified 02/26/21 18:23


 


Penicillins Allergy  Anaphylaxis Verified 02/26/21 18:23














Physical Exam


Vitals: 


                                   Vital Signs











  Temp Pulse Pulse Resp BP BP Pulse Ox


 


 02/27/21 13:32  99.1 F   76  19   128/65  95


 


 02/27/21 07:36    120 H  16   


 


 02/27/21 07:00  98.7 F   120 H  16   156/92  96


 


 02/27/21 02:27  100.2 F H      


 


 02/27/21 00:28  101.5 F H   101 H  17   159/96  99


 


 02/26/21 22:11  98.1 F   92  16   148/101  98


 


 02/26/21 20:01  99.2 F  96   18  135/81   96


 


 02/26/21 18:20  98.7 F  101 H   22  105/102   99








                                Intake and Output











 02/26/21 02/27/21 02/27/21





 22:59 06:59 14:59


 


Intake Total  1620 240


 


Balance  1620 240


 


Intake:   


 


  Oral  1620 240


 


Other:   


 


  # Voids  3 


 


  Weight 118.388 kg  

















PHYSICAL EXAMINATION: 





GENERAL: The patient is alert and oriented x3, not in any acute distress. Well 

developed, well nourished. 


HEENT: Patient has a significant right-sided facial swelling physical exam as di

scussed above local is of temperature tenderness unable to examine the oral 

cavity because of swelling.


CARDIOVASCULAR: S1 and S2 present. No murmurs, rubs, or gallops. 


PULMONARY: Chest is clear to auscultation, no wheezing or crackles. 


ABDOMEN: Soft, nontender, nondistended, normoactive bowel sounds. No palpable 

organomegaly. 


MUSCULOSKELETAL: No joint swelling or deformity.


EXTREMITIES: No cyanosis, clubbing, or pedal edema. 


NEUROLOGICAL: Gross neurological examination did not reveal any focal deficits. 


SKIN: No rashes. 














Results


CBC & Chem 7: 


                                 02/27/21 06:07





                                 02/27/21 06:07


Labs: 


                  Abnormal Lab Results - Last 24 Hours (Table)











  02/26/21 02/26/21 02/27/21 Range/Units





  19:09 19:09 06:07 


 


WBC  12.5 H   14.54 H  (3.8-10.6)  k/uL


 


Immature Gran #    0.07 H  (0.00-0.04)  X 10*3/uL


 


Neutrophils #  9.0 H   11.11 H  (1.3-7.7)  k/uL


 


Monocytes #    1.30 H  (0.20-1.00)  X 10*3/uL


 


Creatinine   0.61 L   (0.66-1.25)  mg/dL


 


Glucose   111 H   (74-99)  mg/dL


 


AST   157 H   (17-59)  U/L


 


ALT   252 H   (4-49)  U/L














Thrombosis Risk Factor Assmnt





- Choose All That Apply


Any of the Below Risk Factors Present?: No


Other Risk Factors: No


Other congenital or acquired thrombophilia - If yes, enter type in comment: No


Thrombosis Risk Factor Assessment Level: Very Low Risk





Assessment and Plan


Plan: 





-Sepsis secondary to somatic with an abscess for which patient underwent 

incision and drainage patient has caries tooth and tooth abscess which will be 

addressed as well.  Patient is presently on Rocephin and clindamycin which will 

be continued which will cover although old organisms.  He is ALLERGIC to 

penicillins


-Tachycardia: Secondary to his long-acting Adderall as well as sepsis continue 

with IV fluids and monitor


-Asthma without any acute exacerbation


-Nicotine use and marijuana use: Counseling was provided

## 2021-02-28 LAB
ALBUMIN SERPL-MCNC: 4.1 G/DL (ref 3.8–4.9)
ALBUMIN/GLOB SERPL: 1.28 G/DL (ref 1.6–3.17)
ALP SERPL-CCNC: 146 U/L (ref 41–126)
ALT SERPL-CCNC: 145 U/L (ref 10–49)
ANION GAP SERPL CALC-SCNC: 8.4 MMOL/L (ref 4–12)
AST SERPL-CCNC: 53 U/L (ref 14–35)
BUN SERPL-SCNC: 9 MG/DL (ref 9–27)
BUN/CREAT SERPL: 11.25 RATIO (ref 12–20)
CALCIUM SPEC-MCNC: 9.4 MG/DL (ref 8.7–10.3)
CHLORIDE SERPL-SCNC: 103 MMOL/L (ref 96–109)
CO2 SERPL-SCNC: 25.6 MMOL/L (ref 21.6–31.8)
ERYTHROCYTE [DISTWIDTH] IN BLOOD BY AUTOMATED COUNT: 4.64 X 10*6/UL (ref 4.4–5.6)
ERYTHROCYTE [DISTWIDTH] IN BLOOD: 13.2 % (ref 11.5–14.5)
GLOBULIN SER CALC-MCNC: 3.2 G/DL (ref 1.6–3.3)
GLUCOSE SERPL-MCNC: 115 MG/DL (ref 70–110)
HCT VFR BLD AUTO: 40.3 % (ref 39.6–50)
HGB BLD-MCNC: 13.7 G/DL (ref 13–17)
MCH RBC QN AUTO: 29.5 PG (ref 27–32)
MCHC RBC AUTO-ENTMCNC: 34 G/DL (ref 32–37)
MCV RBC AUTO: 86.9 FL (ref 80–97)
PLATELET # BLD AUTO: 318 X 10*3/UL (ref 140–440)
POTASSIUM SERPL-SCNC: 4.6 MMOL/L (ref 3.5–5.5)
PROT SERPL-MCNC: 7.3 G/DL (ref 6.2–8.2)
SODIUM SERPL-SCNC: 137 MMOL/L (ref 135–145)
WBC # BLD AUTO: 15.13 X 10*3/UL (ref 4.5–10)

## 2021-02-28 RX ADMIN — HEPARIN SODIUM SCH UNIT: 5000 INJECTION, SOLUTION INTRAVENOUS; SUBCUTANEOUS at 21:44

## 2021-02-28 RX ADMIN — KETOROLAC TROMETHAMINE SCH MG: 15 INJECTION, SOLUTION INTRAMUSCULAR; INTRAVENOUS at 23:05

## 2021-02-28 RX ADMIN — KETOROLAC TROMETHAMINE SCH MG: 15 INJECTION, SOLUTION INTRAMUSCULAR; INTRAVENOUS at 17:23

## 2021-02-28 RX ADMIN — HEPARIN SODIUM SCH UNIT: 5000 INJECTION, SOLUTION INTRAVENOUS; SUBCUTANEOUS at 07:39

## 2021-02-28 RX ADMIN — KETOROLAC TROMETHAMINE SCH MG: 15 INJECTION, SOLUTION INTRAMUSCULAR; INTRAVENOUS at 13:29

## 2021-02-28 RX ADMIN — CEFAZOLIN SCH: 330 INJECTION, POWDER, FOR SOLUTION INTRAMUSCULAR; INTRAVENOUS at 05:30

## 2021-02-28 RX ADMIN — METRONIDAZOLE SCH MLS/HR: 500 INJECTION, SOLUTION INTRAVENOUS at 17:23

## 2021-02-28 RX ADMIN — CEFAZOLIN SCH MLS/HR: 330 INJECTION, POWDER, FOR SOLUTION INTRAMUSCULAR; INTRAVENOUS at 13:36

## 2021-02-28 RX ADMIN — FAMOTIDINE SCH MG: 10 INJECTION, SOLUTION INTRAVENOUS at 07:38

## 2021-02-28 RX ADMIN — KETOROLAC TROMETHAMINE SCH MG: 15 INJECTION, SOLUTION INTRAMUSCULAR; INTRAVENOUS at 05:18

## 2021-02-28 RX ADMIN — METRONIDAZOLE SCH MLS/HR: 500 INJECTION, SOLUTION INTRAVENOUS at 05:19

## 2021-02-28 RX ADMIN — FAMOTIDINE SCH MG: 10 INJECTION, SOLUTION INTRAVENOUS at 21:44

## 2021-02-28 RX ADMIN — METRONIDAZOLE SCH MLS/HR: 500 INJECTION, SOLUTION INTRAVENOUS at 23:04

## 2021-02-28 RX ADMIN — METRONIDAZOLE SCH MLS/HR: 500 INJECTION, SOLUTION INTRAVENOUS at 13:29

## 2021-02-28 RX ADMIN — NICOTINE SCH PATCH: 21 PATCH, EXTENDED RELEASE TRANSDERMAL at 07:38

## 2021-02-28 RX ADMIN — CEFAZOLIN SCH MLS/HR: 330 INJECTION, POWDER, FOR SOLUTION INTRAMUSCULAR; INTRAVENOUS at 21:45

## 2021-02-28 RX ADMIN — CLINDAMYCIN PHOSPHATE SCH MLS/HR: 150 INJECTION, SOLUTION INTRAVENOUS at 09:06

## 2021-02-28 NOTE — P.PN
Subjective


Progress Note Date: 02/28/21


25-year-old male came in with facial swelling found to have submandibular 

abscess subsequently admitted for that.  Patient does have [place] teeth let to 

significant right upper face swelling with the swelling standing up to the right

eye.  Patient up broken molar approximately a month ago patient does smoke.  

Patient was started on Rocephin and clindamycin as patient is ALLERGIC to 

penicillins.  Patient was started on Toradol as well as opiate pain medications 

for pain.  ENT and maxillofacial surgery evaluated the patient patient will 

undergo incision and drainage today along with tooth extraction.





2/28/2021


Patient seen on follow-up, no fevers, white count up a bit at 15.3.  Currently 

receiving antimicrobial therapy with Rocephin and Flagyl, and cultures have been

collected.  Patient is tachycardic this morning, asking for his dose of Adderall

which we are holding due to the tachycardia.








CONSTITUTIONAL:  no malaise, no fatigue. 


HEENT: As mentioned in HPI


CARDIOVASCULAR: No chest pain, orthopnea, PND, no palpitations, no syncope. 


PULMONARY: No shortness of breath, no cough, no hemoptysis. 


GASTROINTESTINAL: No diarrhea, no nausea, 


GENITOURINARY: Denies any burning micturition, frequency, or urgency. 


MUSCULOSKELETAL/RHEUMATOLOGICAL: Denies any joint pain, swelling, or any muscle 

pain. 











Objective





- Vital Signs


Vital signs: 


                                   Vital Signs











Temp  99.0 F   02/28/21 07:00


 


Pulse  97   02/28/21 07:15


 


Resp  17   02/28/21 07:15


 


BP  159/97   02/28/21 07:00


 


Pulse Ox  94 L  02/28/21 07:00








                                 Intake & Output











 02/27/21 02/28/21 02/28/21





 18:59 06:59 18:59


 


Intake Total 890  


 


Output Total 10  


 


Balance 880  


 


Weight 118.388 kg  


 


Intake:   


 


    


 


  Oral 240  


 


Output:   


 


  Estimated Blood Loss 10  


 


Other:   


 


  # Voids  2 














- Exam


GENERAL: The patient is alert and oriented x3, not in any acute distress. Well 

developed, well nourished. 


HEENT: Patient has a significant right-sided facial swelling physical exam as 

discussed above local is of temperature tenderness unable to examine the oral 

cavity because of swelling.


CARDIOVASCULAR: S1 and S2 present. No murmurs, rubs, or gallops. 


PULMONARY: Chest is clear to auscultation, no wheezing or crackles. 


ABDOMEN: Soft, nontender, nondistended, normoactive bowel sounds. No palpable 

organomegaly. 


MUSCULOSKELETAL: No joint swelling or deformity.


EXTREMITIES: No cyanosis, clubbing, or pedal edema. 


NEUROLOGICAL: Gross neurological examination did not reveal any focal deficits. 


SKIN: No rashes. 














- Labs


CBC & Chem 7: 


                                 02/28/21 05:35





                                 02/28/21 05:35


Labs: 


                  Abnormal Lab Results - Last 24 Hours (Table)











  02/28/21 02/28/21 Range/Units





  05:35 05:35 


 


WBC  15.13 H   (4.50-10.00)  X 10*3/uL


 


BUN/Creatinine Ratio   11.25 L  (12.00-20.00)  Ratio


 


Glucose   115 H  ()  mg/dL


 


AST   53 H  (14-35)  U/L


 


ALT   145 H  (10-49)  U/L


 


Alkaline Phosphatase   146 H  ()  U/L


 


Albumin/Globulin Ratio   1.28 L  (1.60-3.17)  g/dL








                      Microbiology - Last 24 Hours (Table)











 02/26/21 18:55 Blood Culture - Preliminary





 Blood    No Growth after 24 hours


 


 02/26/21 19:09 Blood Culture - Preliminary





 Blood    No Growth after 24 hours














Assessment and Plan


Assessment: 


Plan: 





-Sepsis secondary to somatic with an abscess for which patient underwent incisi

on and drainage patient has caries tooth and tooth abscess which will be 

addressed as well.  Patient is presently on Rocephin and Flagyl which will be 

continued which will cover although old organisms.  He is ALLERGIC to 

penicillins.  Wound cultures collected and pending.


-Tachycardia: Secondary to his long-acting Adderall which we are holding  as 

well as sepsis continue with IV fluids and monitor. 


-Asthma without any acute exacerbation


-Nicotine use and marijuana use: Counseling was provided

## 2021-02-28 NOTE — P.PN
Subjective


Progress Note Date: 02/28/21


Principal diagnosis: 





Dental abscess related to the tooth #18


12 hours postop incision and drainage of submandibular abscess and extraction of

tooth #18





Yesterday afternoon patient had a incision and drainage 2 of the right neck 

minimal purulent discharge as well as incision and drainage of the right 

temporal space through an intraoral incision as well as surgical extraction of 

tooth #18.  Patient reported improvement immediately upon postop with his 

discomfort.  Patient is laying flat in bed today but willing and able to get up 

in the chair.  Able to see patient's right eye without difficulty today.  He 

reported getting his contact in without difficulty.  Advised patient not want 

wears contact while preseptal swelling exists.  Spoke with Dr. dove without 

follow order during incision and drainage and we feel that clindamycin may have 

selected out a anaerobic possibly gram-negative bacteria. 





Objective





- Vital Signs


Vital signs: 


                                   Vital Signs











Temp  99.0 F   02/28/21 07:00


 


Pulse  97   02/28/21 07:15


 


Resp  17   02/28/21 07:15


 


BP  159/97   02/28/21 07:00


 


Pulse Ox  94 L  02/28/21 07:00








                                 Intake & Output











 02/27/21 02/28/21 02/28/21





 18:59 06:59 18:59


 


Intake Total 890  


 


Output Total 10  


 


Balance 880  


 


Weight 118.388 kg  


 


Intake:   


 


    


 


  Oral 240  


 


Output:   


 


  Estimated Blood Loss 10  


 


Other:   


 


  # Voids  2 














- Exam


Patient resting in bed flat no apparent distress.  Patient feels improvement.  

Opening mouth better.  Approximately 20 mm to 25 mm.  Intraoral drain still 

present.  Extraoral drains are putting out small amount of pus.  According to 

the nurse approximately $0.50 piece size drainage on drain sponge.  Drains were 

just changed before I got here.








- Labs


CBC & Chem 7: 


                                 02/28/21 05:35





                                 02/28/21 05:35


Labs: 


                  Abnormal Lab Results - Last 24 Hours (Table)











  02/28/21 02/28/21 Range/Units





  05:35 05:35 


 


WBC  15.13 H   (4.50-10.00)  X 10*3/uL


 


BUN/Creatinine Ratio   11.25 L  (12.00-20.00)  Ratio


 


Glucose   115 H  ()  mg/dL


 


AST   53 H  (14-35)  U/L


 


ALT   145 H  (10-49)  U/L


 


Alkaline Phosphatase   146 H  ()  U/L


 


Albumin/Globulin Ratio   1.28 L  (1.60-3.17)  g/dL








                      Microbiology - Last 24 Hours (Table)











 02/26/21 18:55 Blood Culture - Preliminary





 Blood    No Growth after 24 hours


 


 02/26/21 19:09 Blood Culture - Preliminary





 Blood    No Growth after 24 hours














Assessment and Plan


Assessment: 


Dental abscess of the right temporal and right submandibular space appears to be

improving with current treatment.





Plan: 


Nick with Dr. Zavala feel the clindamycin can be dropped from our treatment 

at this point with the addition of metronidazole.  We both agree keeping 

Rocephin on board is a good idea until cultures come back.


Recommend keeping patient upright in chair with a heating pad on the side of his

face.  Also recommend dressing changes every shift and please save dressings so 

I can examine him tomorrow thank you





Time with Patient: Greater than 30 (Counseling patient about future dressing 

changes.  Counseling patient about proper positioning.  Counseling patient about

home care.)

## 2021-03-01 RX ADMIN — KETOROLAC TROMETHAMINE SCH MG: 15 INJECTION, SOLUTION INTRAMUSCULAR; INTRAVENOUS at 11:17

## 2021-03-01 RX ADMIN — METRONIDAZOLE SCH MLS/HR: 500 INJECTION, SOLUTION INTRAVENOUS at 11:18

## 2021-03-01 RX ADMIN — NICOTINE SCH PATCH: 21 PATCH, EXTENDED RELEASE TRANSDERMAL at 08:54

## 2021-03-01 RX ADMIN — CEFAZOLIN SCH MLS/HR: 330 INJECTION, POWDER, FOR SOLUTION INTRAMUSCULAR; INTRAVENOUS at 23:35

## 2021-03-01 RX ADMIN — FAMOTIDINE SCH MG: 10 INJECTION, SOLUTION INTRAVENOUS at 08:53

## 2021-03-01 RX ADMIN — METRONIDAZOLE SCH MLS/HR: 500 INJECTION, SOLUTION INTRAVENOUS at 17:00

## 2021-03-01 RX ADMIN — ACETAMINOPHEN PRN MG: 325 TABLET, FILM COATED ORAL at 23:51

## 2021-03-01 RX ADMIN — KETOROLAC TROMETHAMINE SCH MG: 15 INJECTION, SOLUTION INTRAMUSCULAR; INTRAVENOUS at 05:12

## 2021-03-01 RX ADMIN — KETOROLAC TROMETHAMINE SCH MG: 15 INJECTION, SOLUTION INTRAMUSCULAR; INTRAVENOUS at 17:00

## 2021-03-01 RX ADMIN — CEFAZOLIN SCH MLS/HR: 330 INJECTION, POWDER, FOR SOLUTION INTRAMUSCULAR; INTRAVENOUS at 05:12

## 2021-03-01 RX ADMIN — METRONIDAZOLE SCH MLS/HR: 500 INJECTION, SOLUTION INTRAVENOUS at 23:30

## 2021-03-01 RX ADMIN — CEFAZOLIN SCH MLS/HR: 330 INJECTION, POWDER, FOR SOLUTION INTRAMUSCULAR; INTRAVENOUS at 11:25

## 2021-03-01 RX ADMIN — METRONIDAZOLE SCH MLS/HR: 500 INJECTION, SOLUTION INTRAVENOUS at 05:12

## 2021-03-01 RX ADMIN — HEPARIN SODIUM SCH UNIT: 5000 INJECTION, SOLUTION INTRAVENOUS; SUBCUTANEOUS at 19:55

## 2021-03-01 RX ADMIN — FAMOTIDINE SCH MG: 10 INJECTION, SOLUTION INTRAVENOUS at 19:56

## 2021-03-01 RX ADMIN — Medication PRN MG: at 04:05

## 2021-03-01 RX ADMIN — HEPARIN SODIUM SCH UNIT: 5000 INJECTION, SOLUTION INTRAVENOUS; SUBCUTANEOUS at 08:53

## 2021-03-01 NOTE — PN
PROGRESS NOTE



DATE OF SERVICE:

03/01/2021



This 25-year-old gentleman who was admitted with significant sepsis as well as a

submandibular abscess had incision and drainage.  The patient is on broad-spectrum IV

antibiotics at this time.  The white count is elevated to 15.3, and AST and ALT are

also elevated. COVID-19 is negative.  The cultures are negative at this time.  The

patient was also seen by Dr. Ventura. Past medical history reviewed.



REVIEW OF SYSTEMS:

HEENT: As mentioned earlier.

CARDIOVASCULAR SYSTEM: No angina, palpitations.

RESPIRATORY SYSTEM: As mentioned earlier.

GI: As mentioned earlier.

: No dysuria or retention.

NERVOUS SYSTEM: No numbness, weakness.



CURRENT MEDICATIONS:

Reviewed. They include Tylenol, Rocephin, Dilaudid, Narcan.



PHYSICAL EXAMINATION:

Patient is alert, oriented x3.  The pulse is 84, blood pressure 122/73, respirations

19, temperature 98.4, pulse ox 97% on room air.

HEENT:  Conjunctivae normal.

NECK: No jugular venous distention.

CARDIOVASCULAR SYSTEM:  S1, S2 muffled.

RESPIRATORY SYSTEM: Breath sounds diminished at the bases.  No rhonchi. No crackles.

ABDOMEN: Soft, non-tender.

NERVOUS SYSTEM: No focal deficit.

EXAMINATION OF THE JAW: Significant pain and swelling present.



LABS:

WBC 15.3. Other labs are noted.



ASSESSMENT:

1. Acute right submandibular abscess with sepsis, status post incision and drainage.

2. History of carious tooth and tooth abscess.

3. Tachycardia.

4. History of asthma.

5. Increased white count with leukocytosis.

6. Elevated random glucose.

7. Increased AST, ALT.

8. History of degenerative joint disease.

9. History of cholecystectomy.

10.History of attention deficit disorder, attention deficit hyperactivity disorder.

11.History of continued ongoing nicotine dependence.

12.History of tetrahydrocannabinol.



RECOMMENDATIONS AND DISCUSSION:

In this 25-year-old gentleman who presented with multiple complex medical issues, we

will monitor the patient closely, continue the current medications, continue with

symptomatic treatment. Will continue the combination of Rocephin and Flagyl and

continue to monitor.  We will continue with Toradol and monitor labs closely.

Otherwise, closely follow with Oral Surgery.  We will cut down the IV fluids.  Guarded

prognosis.  Further recommendations to follow.





MMODL / IJN: [id number] / Job#: [id number]

## 2021-03-01 NOTE — P.PN
Subjective


Progress Note Date: 03/01/21


Principal diagnosis: 





Dental abscess related to the tooth #18


48 hours postop incision and drainage of submandibular abscess and extraction of

tooth #18





Saturday afternoon patient had a incision and drainage 2 of the right neck 

minimal purulent discharge as well as incision and drainage of the right 

temporal space through an intraoral incision as well as surgical extraction of 

tooth #18.  Patient reported improvement immediately upon postop with his 

discomfort.  Patient is up in his chair today with his girlfriend at the side 

seems comfortable able to see out of his right eye the swelling is no longer in 

the preseptal region.  





Objective





- Vital Signs


Vital signs: 


                                   Vital Signs











Temp  98.4 F   03/01/21 14:12


 


Pulse  84   03/01/21 14:12


 


Resp  19   03/01/21 14:12


 


BP  122/73   03/01/21 14:12


 


Pulse Ox  97   03/01/21 14:12








                                 Intake & Output











 02/28/21 03/01/21 03/01/21





 18:59 06:59 18:59


 


Other:   


 


  # Voids 2  1














- Exam


On the patient's buccal space still appears swollen submandibular much less 

swollen.  Opening slightly larger than yesterday.  Intraoral drain seems intact 

no drainage noted.  Tooth extraction site appears within normal limits from what

I can see.  Still limited exam.  Extraoral drains are putting out appropriately.

 The drainage dressings from last night were observed and seen to have 

serosanguineous and blood of approximately 20 mL total.  





- Labs


CBC & Chem 7: 


                                 02/28/21 05:35





                                 02/28/21 05:35


Labs: 


                      Microbiology - Last 24 Hours (Table)











 02/27/21 16:00 Wound Culture - Preliminary





 Neck 


 


 02/27/21 16:00 Anaerobic Culture - Preliminary





 Neck 


 


 02/26/21 18:55 Blood Culture Gram Stain - Preliminary





 Blood 


 


 02/26/21 18:55 Blood Culture - Final





 Blood 


 


 02/26/21 19:09 Blood Culture Gram Stain - Preliminary





 Blood 


 


 02/26/21 19:09 Blood Culture - Final





 Blood 











 the cultures from the neck have been received.














Assessment and Plan


Assessment: 


Dental abscess of the right temporal and right submandibular space appears to be

improving with current treatment.  Plan to keep drains in longer due to mimi

nued output.





Plan: 


Patient reports feeling better continues to improve.  Recommend continue current

therapy patient was unable to tolerate the heating pad on the side of the face 

due to increased pain.

## 2021-03-02 LAB
ALBUMIN SERPL-MCNC: 4.4 G/DL (ref 3.8–4.9)
ALBUMIN/GLOB SERPL: 1.91 G/DL (ref 1.6–3.17)
ALP SERPL-CCNC: 132 U/L (ref 41–126)
ALT SERPL-CCNC: 169 U/L (ref 10–49)
ANION GAP SERPL CALC-SCNC: 9.5 MMOL/L (ref 4–12)
AST SERPL-CCNC: 111 U/L (ref 14–35)
BASOPHILS # BLD AUTO: 0.02 X 10*3/UL (ref 0–0.1)
BASOPHILS NFR BLD AUTO: 0.3 %
BUN SERPL-SCNC: 12 MG/DL (ref 9–27)
BUN/CREAT SERPL: 13.33 RATIO (ref 12–20)
CALCIUM SPEC-MCNC: 9.3 MG/DL (ref 8.7–10.3)
CHLORIDE SERPL-SCNC: 102 MMOL/L (ref 96–109)
CO2 SERPL-SCNC: 27.5 MMOL/L (ref 21.6–31.8)
EOSINOPHIL # BLD AUTO: 0.2 X 10*3/UL (ref 0.04–0.35)
EOSINOPHIL NFR BLD AUTO: 3 %
ERYTHROCYTE [DISTWIDTH] IN BLOOD BY AUTOMATED COUNT: 4.58 X 10*6/UL (ref 4.4–5.6)
ERYTHROCYTE [DISTWIDTH] IN BLOOD: 13.5 % (ref 11.5–14.5)
GLOBULIN SER CALC-MCNC: 2.3 G/DL (ref 1.6–3.3)
GLUCOSE SERPL-MCNC: 79 MG/DL (ref 70–110)
HCT VFR BLD AUTO: 40.5 % (ref 39.6–50)
HGB BLD-MCNC: 13.3 G/DL (ref 13–17)
LYMPHOCYTES # SPEC AUTO: 2.02 X 10*3/UL (ref 0.9–5)
LYMPHOCYTES NFR SPEC AUTO: 30.8 %
MCH RBC QN AUTO: 29 PG (ref 27–32)
MCHC RBC AUTO-ENTMCNC: 32.8 G/DL (ref 32–37)
MCV RBC AUTO: 88.4 FL (ref 80–97)
MONOCYTES # BLD AUTO: 0.58 X 10*3/UL (ref 0.2–1)
MONOCYTES NFR BLD AUTO: 8.8 %
NEUTROPHILS # BLD AUTO: 3.69 X 10*3/UL (ref 1.8–7.7)
NEUTROPHILS NFR BLD AUTO: 56.3 %
PLATELET # BLD AUTO: 342 X 10*3/UL (ref 140–440)
POTASSIUM SERPL-SCNC: 4.5 MMOL/L (ref 3.5–5.5)
PROT SERPL-MCNC: 6.7 G/DL (ref 6.2–8.2)
SODIUM SERPL-SCNC: 139 MMOL/L (ref 135–145)
WBC # BLD AUTO: 6.56 X 10*3/UL (ref 4.5–10)

## 2021-03-02 RX ADMIN — NICOTINE SCH PATCH: 21 PATCH, EXTENDED RELEASE TRANSDERMAL at 07:08

## 2021-03-02 RX ADMIN — Medication PRN MG: at 04:52

## 2021-03-02 RX ADMIN — METRONIDAZOLE SCH MLS/HR: 500 INJECTION, SOLUTION INTRAVENOUS at 11:28

## 2021-03-02 RX ADMIN — HYDROCODONE BITARTRATE AND ACETAMINOPHEN PRN EACH: 5; 325 TABLET ORAL at 23:14

## 2021-03-02 RX ADMIN — HYDROCODONE BITARTRATE AND ACETAMINOPHEN PRN EACH: 5; 325 TABLET ORAL at 12:11

## 2021-03-02 RX ADMIN — HYDROCODONE BITARTRATE AND ACETAMINOPHEN PRN EACH: 5; 325 TABLET ORAL at 07:08

## 2021-03-02 RX ADMIN — HEPARIN SODIUM SCH UNIT: 5000 INJECTION, SOLUTION INTRAVENOUS; SUBCUTANEOUS at 07:08

## 2021-03-02 RX ADMIN — HEPARIN SODIUM SCH UNIT: 5000 INJECTION, SOLUTION INTRAVENOUS; SUBCUTANEOUS at 19:50

## 2021-03-02 RX ADMIN — IBUPROFEN SCH MG: 600 TABLET ORAL at 09:38

## 2021-03-02 RX ADMIN — FAMOTIDINE SCH MG: 10 INJECTION, SOLUTION INTRAVENOUS at 19:50

## 2021-03-02 RX ADMIN — IBUPROFEN SCH MG: 600 TABLET ORAL at 13:27

## 2021-03-02 RX ADMIN — HYDROCODONE BITARTRATE AND ACETAMINOPHEN PRN EACH: 5; 325 TABLET ORAL at 18:48

## 2021-03-02 RX ADMIN — METRONIDAZOLE SCH MLS/HR: 500 INJECTION, SOLUTION INTRAVENOUS at 22:55

## 2021-03-02 RX ADMIN — IBUPROFEN SCH MG: 600 TABLET ORAL at 17:25

## 2021-03-02 RX ADMIN — METRONIDAZOLE SCH MLS/HR: 500 INJECTION, SOLUTION INTRAVENOUS at 05:31

## 2021-03-02 RX ADMIN — IBUPROFEN SCH MG: 600 TABLET ORAL at 22:56

## 2021-03-02 RX ADMIN — METRONIDAZOLE SCH MLS/HR: 500 INJECTION, SOLUTION INTRAVENOUS at 17:21

## 2021-03-02 RX ADMIN — HEPARIN SODIUM SCH: 5000 INJECTION, SOLUTION INTRAVENOUS; SUBCUTANEOUS at 07:13

## 2021-03-02 RX ADMIN — FAMOTIDINE SCH MG: 10 INJECTION, SOLUTION INTRAVENOUS at 07:09

## 2021-03-02 NOTE — PN
PROGRESS NOTE



DATE OF SERVICE:

03/02/2021



This 25-year-old gentleman admitted with significant sepsis and submandibular abscess

is being closely monitored at this time.  Dr. Ventura has performed incision and drainage

for dental abscess.  The blood cultures showing anaerobic Gram-positive cocci. The

patient is being closely monitored. Past medical history reviewed.



REVIEW OF SYSTEMS:

ENT: As mentioned earlier.

CARDIOVASCULAR SYSTEM: No angina, palpitations.

RESPIRATORY SYSTEM: As mentioned earlier.

GI: As mentioned earlier.

NERVOUS SYSTEM: No numbness, weakness.



CURRENT MEDICATIONS:

Reviewed. They include Norco, Tylenol, Rocephin, heparin, Dilaudid, Motrin, Flagyl.



PHYSICAL EXAMINATION:

Patient is alert and oriented x3. Pulse 95, blood pressure 155/99, respiration 18,

temperature 98.8, pulse ox 97% on room air.

HEENT: Conjunctivae normal. Examination of the face: Significant swelling of the right

side of the face still present.

NECK: No jugular venous distention.

CARDIOVASCULAR SYSTEM:  S1, S2 muffled.

RESPIRATORY SYSTEM: Breath sounds diminished at the bases.  A few scattered rhonchi.

ABDOMEN: Soft, non-tender.

NERVOUS SYSTEM: No focal deficit.



LABS:

WBC 6.56 and AST is 111, ALT is 169.



ASSESSMENT:

1. Acute right submandibular abscess with sepsis, status post incision and drainage

    with dental abscess.

2. Anaerobic Gram-positive cocci sepsis.

3. History of carious teeth and tooth abscess.

4. Tachycardia.

5. History of asthma.

6. Increased white count and leukocytosis and elevated random glucose.

7. Elevated AST, ALT.

8. History of degenerative joint disease.

9. History of cholecystectomy.

10.History of attention deficit disorder, attention deficit hyperactivity disorder.

11.History of continued ongoing nicotine dependence.

12.History of tetrahydrocannabinol.



RECOMMENDATIONS AND DISCUSSION:

I recommend to continue current medications, continue with the monitoring, symptomatic

treatment. Infectious disease evaluation.  Repeat cultures.  Continue the antibiotics.

Guarded prognosis because of multiple complex medical issues.  Further recommendations

to follow. The patient is ALLERGIC TO PENICILLIN.





MMODL / IJN: [id number] / Job#: [id number]

## 2021-03-02 NOTE — P.PN
Subjective


Progress Note Date: 03/02/21


Principal diagnosis: 





Dental abscess related to the tooth #18


3 days postop incision and drainage of submandibular abscess and extraction of 

tooth #18





Saturday afternoon patient had a incision and drainage 2 of the right neck 

minimal purulent discharge as well as incision and drainage of the right 

temporal space through an intraoral incision as well as surgical extraction of t

ooth #18.  Patient reported improvement immediately upon postop with his 

discomfort.  up in chair watching TV and comfortable has stoped toradol per 

protocol. 





Objective





- Vital Signs


Vital signs: 


                                   Vital Signs











Temp  98.8 F   03/02/21 07:00


 


Pulse  95   03/02/21 07:00


 


Resp  18   03/02/21 07:00


 


BP  155/99   03/02/21 07:00


 


Pulse Ox  97   03/02/21 07:00








                                 Intake & Output











 03/01/21 03/02/21 03/02/21





 18:59 06:59 18:59


 


Other:   


 


  # Voids 1 2 














- Exam


On the patient's buccal space still appears swollen submandibular much less 

swollen.  Opening slightly larger than yesterday.  Intraoral drain seems intact 

no drainage noted.  Tooth extraction site appears within normal limits from what

I can see.  Still limited exam.  Extraoral drains are putting out appropriately.

 The drainage dressings from last night were observed and seen to have sero 

sanguineous and blood of approximately 10 mL total.  





- Labs


CBC & Chem 7: 


                                 02/28/21 05:35





                                 02/28/21 05:35


Labs: 


                      Microbiology - Last 24 Hours (Table)











 02/27/21 16:00 Gram Stain - Preliminary





 Neck Wound Culture - Preliminary


 


 02/27/21 16:00 Anaerobic Culture - Preliminary





 Neck 


 


 02/26/21 18:55 Blood Culture Gram Stain - Preliminary





 Blood 














Assessment and Plan


Assessment: 


Dental abscess of the right temporal and right submandibular space appears to be

improving with current treatment.  Plan to keep drains in longer due to 

continued output.





Plan: 


Patient reports feeling better continues to improve. 


Plan to replace toradol with Motrin 600 qid


no more ice on face as this will decrease blood flow to area

## 2021-03-03 LAB
ALBUMIN SERPL-MCNC: 4.2 G/DL (ref 3.8–4.9)
ALBUMIN/GLOB SERPL: 1.27 G/DL (ref 1.6–3.17)
ALP SERPL-CCNC: 163 U/L (ref 41–126)
ALT SERPL-CCNC: 197 U/L (ref 10–49)
ANION GAP SERPL CALC-SCNC: 9.7 MMOL/L (ref 4–12)
AST SERPL-CCNC: 126 U/L (ref 14–35)
BASOPHILS # BLD AUTO: 0.03 X 10*3/UL (ref 0–0.1)
BASOPHILS NFR BLD AUTO: 0.5 %
BUN SERPL-SCNC: 14 MG/DL (ref 9–27)
BUN/CREAT SERPL: 17.5 RATIO (ref 12–20)
CALCIUM SPEC-MCNC: 9.8 MG/DL (ref 8.7–10.3)
CHLORIDE SERPL-SCNC: 104 MMOL/L (ref 96–109)
CO2 SERPL-SCNC: 25.3 MMOL/L (ref 21.6–31.8)
EOSINOPHIL # BLD AUTO: 0.16 X 10*3/UL (ref 0.04–0.35)
EOSINOPHIL NFR BLD AUTO: 2.8 %
ERYTHROCYTE [DISTWIDTH] IN BLOOD BY AUTOMATED COUNT: 4.97 X 10*6/UL (ref 4.4–5.6)
ERYTHROCYTE [DISTWIDTH] IN BLOOD: 13.5 % (ref 11.5–14.5)
GLOBULIN SER CALC-MCNC: 3.3 G/DL (ref 1.6–3.3)
GLUCOSE SERPL-MCNC: 81 MG/DL (ref 70–110)
HCT VFR BLD AUTO: 44.2 % (ref 39.6–50)
HGB BLD-MCNC: 14.4 G/DL (ref 13–17)
LYMPHOCYTES # SPEC AUTO: 1.68 X 10*3/UL (ref 0.9–5)
LYMPHOCYTES NFR SPEC AUTO: 29.5 %
MCH RBC QN AUTO: 29 PG (ref 27–32)
MCHC RBC AUTO-ENTMCNC: 32.6 G/DL (ref 32–37)
MCV RBC AUTO: 88.9 FL (ref 80–97)
MONOCYTES # BLD AUTO: 0.5 X 10*3/UL (ref 0.2–1)
MONOCYTES NFR BLD AUTO: 8.8 %
NEUTROPHILS # BLD AUTO: 3.18 X 10*3/UL (ref 1.8–7.7)
NEUTROPHILS NFR BLD AUTO: 55.9 %
PLATELET # BLD AUTO: 364 X 10*3/UL (ref 140–440)
POTASSIUM SERPL-SCNC: 4.7 MMOL/L (ref 3.5–5.5)
PROT SERPL-MCNC: 7.5 G/DL (ref 6.2–8.2)
SODIUM SERPL-SCNC: 139 MMOL/L (ref 135–145)
WBC # BLD AUTO: 5.69 X 10*3/UL (ref 4.5–10)

## 2021-03-03 RX ADMIN — HYDROCODONE BITARTRATE AND ACETAMINOPHEN PRN EACH: 5; 325 TABLET ORAL at 13:07

## 2021-03-03 RX ADMIN — METRONIDAZOLE SCH MLS/HR: 500 INJECTION, SOLUTION INTRAVENOUS at 11:36

## 2021-03-03 RX ADMIN — HEPARIN SODIUM SCH UNIT: 5000 INJECTION, SOLUTION INTRAVENOUS; SUBCUTANEOUS at 08:12

## 2021-03-03 RX ADMIN — FAMOTIDINE SCH MG: 10 INJECTION, SOLUTION INTRAVENOUS at 08:12

## 2021-03-03 RX ADMIN — HYDROCODONE BITARTRATE AND ACETAMINOPHEN PRN EACH: 7.5; 325 TABLET ORAL at 18:06

## 2021-03-03 RX ADMIN — CEFAZOLIN SCH: 330 INJECTION, POWDER, FOR SOLUTION INTRAMUSCULAR; INTRAVENOUS at 02:39

## 2021-03-03 RX ADMIN — NICOTINE SCH PATCH: 21 PATCH, EXTENDED RELEASE TRANSDERMAL at 08:12

## 2021-03-03 RX ADMIN — IBUPROFEN SCH MG: 600 TABLET ORAL at 11:58

## 2021-03-03 RX ADMIN — FAMOTIDINE SCH MG: 10 INJECTION, SOLUTION INTRAVENOUS at 22:30

## 2021-03-03 RX ADMIN — HYDROCODONE BITARTRATE AND ACETAMINOPHEN PRN EACH: 5; 325 TABLET ORAL at 08:20

## 2021-03-03 RX ADMIN — IBUPROFEN SCH MG: 600 TABLET ORAL at 17:03

## 2021-03-03 RX ADMIN — ASPIRIN SCH: 325 TABLET ORAL at 12:50

## 2021-03-03 RX ADMIN — METRONIDAZOLE SCH MLS/HR: 500 INJECTION, SOLUTION INTRAVENOUS at 05:35

## 2021-03-03 RX ADMIN — IBUPROFEN SCH MG: 600 TABLET ORAL at 08:11

## 2021-03-03 RX ADMIN — HEPARIN SODIUM SCH: 5000 INJECTION, SOLUTION INTRAVENOUS; SUBCUTANEOUS at 20:43

## 2021-03-03 RX ADMIN — IBUPROFEN SCH MG: 600 TABLET ORAL at 22:30

## 2021-03-03 RX ADMIN — METRONIDAZOLE SCH MLS/HR: 500 INJECTION, SOLUTION INTRAVENOUS at 17:20

## 2021-03-03 NOTE — PN
PROGRESS NOTE



DATE OF SERVICE:

03/03/2021.



This 25-year-old gentleman who was admitted with acute right submandibular 
abscess with

sepsis had incision and drainage of the dental abscess.  The patient also had 
anaerobic

gram-positive cocci, sepsis also. The final ID is pending.  No chest pain.  No

palpitations.  The patient has swelling . PICC line and outpatient antibiotics

being plan by Dr. Paris.



PHYSICAL EXAMINATION:

The patient is alert and oriented x3.  Pulse 92, blood pressure 127/89, 
respiration 16,

temperature 98.1, pulse ox 97% on room air.

HEENT:  Conjunctivae normal.

NECK: No jugular venous distention.

CARDIOVASCULAR:  S1, S2 muffled.

RESPIRATORY: Breath sounds diminished at the bases. No rhonchi, no crackles.

ABDOMEN:  Soft.

NERVOUS SYSTEM: No focal deficits.

Examination of face significant swelling present.  Drains are being removed.



LABS:

CBC within normal limits.  LFTs, AST is 126, ALT is 197. Albumin noted.



ASSESSMENT:

1. Acute right submandibular abscess with sepsis, status post incision and 
drainage

    with dental abscess.

2. Anaerobic Gram-positive cocci sepsis.

3. History of carious teeth and tooth abscess.

4. Elevated AST, ALT.

5. Tachycardia.

6. History of asthma.

7. Increased WBC and leukocytosis with secondary to sepsis.

8. Elevated random glucose.

9. History of degenerative joint disease.

10.History of cholecystectomy.

11.History of attention deficit disorder, attention deficit hyperactivity 
disorder.

12.History of continued ongoing nicotine dependence.

13.History of THC.



RECOMMENDATIONS AND DISCUSSION:

I recommend to continue current medications and continue symptomatic treatment.

Continue with IV antibiotics.  I would also recommend followup of the LFTs.  
Otherwise,

closely follow with Oral Surgery and as well as Infectious Disease.  Prognosis 
guarded

because of multiple complex medical issues as mentioned earlier.  Continue with 
the

Adderall. Further recommendations to follow.





MMODL / IJN: [id number] / Job#: [id number]

[email or web address]

## 2021-03-03 NOTE — P.PN
Subjective


Progress Note Date: 03/03/21





HISTORY OF PRESENT ILLNESS


This is a 25-year-old male patient seen for right submandibular abscess and donald

teremia.  Patient has undergone multiple courses of antibiotics in the 

outpatient setting without improvement.  He presented on every 27th with 

increasing pain swelling and redness to the right lower jaw area.  He also had 

fever and significant pain.  CAT scan of the facial area was concerning for s

ubmandibular abscess.  Patient is status post extraction of tooth #18 and 

drainage of submandibular abscess.  Patient has been on Rocephin and Flagyl 

because of penicillin ALLERGY.  Blood culture is positive for anaerobic gram-

positive cocci.  Waiting for repeat blood culture which is status received to be

cleared prior to midline placement. Patient has been afebrile, heart rate 79, 

blood pressure 127/84, pulse ox 90% on room air.  Leukocytosis resolved with WBC

5.6.  Creatinine 0.8.





   


PHYSICAL EXAMINATION


Gen: This is a 25-year-old male patient.  He is resting in bed and appears to be

comfortable.  No acute distress.


HEENT: Head is atraumatic, normocephalic. Pupils equal, round. Sclerae is 

anicteric.  Mild swelling to the right lower jaw.


NECK: Supple. No JVD. No lymphadenopathy. 


LUNGS: Clear to auscultation. No wheezes or rhonchi.  No intercostal 

retractions.


HEART: Regular rate and rhythm.  


ABDOMEN: Soft. No masses.  No tenderness.


EXTREMITIES: No pedal edema. 


NEUROLOGICAL: Patient is awake, alert and oriented x3. 








ASSESSMENT


Right submandibular abscess from infected tooth #18 status post extraction of 

infected tooth and drainage of abscess


Anaerobic gram-positive cocci bacteremia








PLAN


Continue Rocephin 2 g IV piggyback daily and Flagyl 500 mg 3 times daily


Prescriptions provided for Rocephin 2 g IV piggyback daily for 10 days to case 

manager


Prescription for Flagyl 500 mg oral every 8 hours times 10 day course, sent to 

pharmacy


Midline insertion once repeat blood culture is without growth, currently status 

received


Further recommendations as patient progresses.








The above dictated assessment and findings were discussed with Dr. Paris.  The 

impression and plan of care have been directed as dictated.  Edith Dhaliwal nurse 

practitioner acting as scribe for Dr. Paris.








Objective





- Vital Signs


Vital signs: 


                                   Vital Signs











Temp  98.1 F   03/03/21 07:00


 


Pulse  79   03/03/21 07:00


 


Resp  16   03/03/21 07:00


 


BP  127/84   03/03/21 07:00


 


Pulse Ox  98   03/03/21 07:00








                                 Intake & Output











 03/02/21 03/03/21 03/03/21





 18:59 06:59 18:59


 


Other:   


 


  # Voids 1  














- Labs


CBC & Chem 7: 


                                 03/03/21 06:21





                                 03/03/21 06:21


Labs: 


                      Microbiology - Last 24 Hours (Table)











 02/26/21 18:55 Blood Culture Gram Stain - Preliminary





 Blood Blood Culture - Preliminary





    Anaerobic Gram Positive Cocci


 


 02/26/21 19:09 Blood Culture Gram Stain - Preliminary





 Blood Blood Culture - Preliminary





    Anaerobic Gram Positive Cocci


 


 02/27/21 16:00 Gram Stain - Preliminary





 Neck Wound Culture - Preliminary

## 2021-03-03 NOTE — P.PN
Subjective


Progress Note Date: 03/03/21


Principal diagnosis: 





Dental abscess related to the tooth #18


4 days postop incision and drainage of submandibular abscess and extraction of 

tooth #18





Saturday afternoon patient had a incision and drainage 2 of the right neck 

minimal purulent discharge as well as incision and drainage of the right 

temporal space through an intraoral incision as well as surgical extraction of t

ooth #18.  Patient reported improvement immediately upon postop with his 

discomfort.  


Today feels much better. 





Objective





- Vital Signs


Vital signs: 


                                   Vital Signs











Temp  98.1 F   03/03/21 07:00


 


Pulse  79   03/03/21 07:00


 


Resp  16   03/03/21 08:00


 


BP  127/84   03/03/21 07:00


 


Pulse Ox  98   03/03/21 07:00








                                 Intake & Output











 03/02/21 03/03/21 03/03/21





 18:59 06:59 18:59


 


Other:   


 


  # Voids 1  














- Exam


On the patient's buccal space still appears swollen submandibular much less 

swollen.  Opening slightly larger than yesterday.  Intraoral drain seems intact 

no drainage noted.  Tooth extraction site appears within normal limits from what

I can see.  Still limited exam.  Extraoral drains are putting out appropriately.

 The drainage dressings from last night were observed and seen to have sero 

sanguineous and blood of approximately 10 mL total. 





Today the patient is up in his chair resting comfortably watching TV visibly 

less swollen than the day before.  Again his is opening is improved but again 

the intraoral drain does not appear to be draining.  Studied the dressing from 

last night and the anterior extraoral drain appears to be putting out more 

fluid.  Approximately 5-10 mL of mostly purulence the very foul oder per 

patient.  The posterior external drain appears to have stopped draining.   





- Labs


CBC & Chem 7: 


                                 03/03/21 06:21





                                 03/03/21 06:21


Labs: 


                  Abnormal Lab Results - Last 24 Hours (Table)











  03/03/21 03/03/21 Range/Units





  06:21 06:21 


 


Immature Gran #  0.14 H   (0.00-0.04)  X 10*3/uL


 


AST   126 H  (14-35)  U/L


 


ALT   197 H  (10-49)  U/L


 


Alkaline Phosphatase   163 H  ()  U/L


 


C-Reactive Protein   5.9 H  (0.0-0.8)  mg/dL


 


Albumin/Globulin Ratio   1.27 L  (1.60-3.17)  g/dL








                      Microbiology - Last 24 Hours (Table)











 02/26/21 18:55 Blood Culture Gram Stain - Preliminary





 Blood Blood Culture - Preliminary





    Anaerobic Gram Positive Cocci


 


 02/26/21 19:09 Blood Culture Gram Stain - Preliminary





 Blood Blood Culture - Preliminary





    Anaerobic Gram Positive Cocci


 


 02/27/21 16:00 Gram Stain - Preliminary





 Neck Wound Culture - Preliminary








Still awaiting anaerobic cultures in the wound.





Assessment and Plan


Assessment: 


Dental abscess of the right temporal and right submandibular space appears to be

improving with current treatment. 


Plan: 


Patient continues to improve.  Have removed the posterior extraoral drain in the

intraoral drain as there is no output.  The anterior extraoral drain is left in 

place.  Encouraged patient to continue to open his mouth stretch the muscles in 

an effort to increase drainage.

## 2021-03-03 NOTE — CONS
CONSULTATION



DATE OF SERVICE:

03/02/2021



REASON FOR CONSULTATION:

1. Right submandibular abscess.

2. Bacteremia.



HISTORY OF PRESENT ILLNESS:

The patient is a 25-year-old  male who has been dealing with a painful right

lower jaw tooth for more than a month. Has been treated with multiple courses of

antibiotic in the outpatient setting without any improvement.  The patient presented to

[facility] on the 27th for increasing pain, swelling and redness to

the right lower jaw area.  Symptoms has been getting worse for the last few days before

presentation to the hospital.  The patient describes the pain to be throbbing,

intensity almost 7-8/10 with associated swelling and redness but no drainage.  Patient

on presentation to the hospital did have a fever of 101.5 degrees Fahrenheit.  The

patient did have a CT of the facial area with concern for submandibular abscess.  The

patient did have white count of 12.5 on admission, which was up to 15.13 on the 28th

but subsequently normalized.  The patient was evaluated by Oral Surgery and the patient

is status post extraction of tooth #18 and drainage of the submandibular abscess. Local

cultures currently pending.  Patient has been treated with Rocephin and Flagyl because

of PENICILLIN allergy with blood cultures currently positive with anaerobic gram-

positive cocci.  Infectious Disease consultation for recommendation for discharge

antibiotics.



REVIEW OF SYSTEMS:

Positive points have been mentioned in HPI.  Rest of the systems are negative.



PAST MEDICAL HISTORY:

Asthma and osteoarthritis.



PAST SURGICAL HISTORY:

Cholecystectomy.



SOCIAL HISTORY:

Current everyday smoker. Occasionally drinks.  Admits to marijuana use.



FAMILY HISTORY:

No pertinent findings noticed.



ALLERGIES:

MELOXICAM AND PENICILLIN.



MEDICATIONS:

The patient is currently on Tylenol, Norco, Xanax, Rocephin 2 grams daily, Pepcid,

heparin, Dilaudid, Flagyl, Narcan, nicotine patch, Restoril and IV fluid.



PHYSICAL EXAMINATION:

Blood pressure 137/87 with a pulse of 87, temperature 98.3, he is 97% on room air.

GENERAL DESCRIPTION: The patient is a middle-aged male up in the chair in no distress.

HEENT:  Examination shows no pallor or scleral icterus.  Right lower jaw area did have

some swelling and redness, minimal drainage on the dressing.

NECK: Trachea central, no thyromegaly.

LUNGS: Unlabored breathing, clear to auscultation anteriorly.  No wheeze or crackles.

HEART:  S1, S2.  Regular rate and rhythm.

ABDOMEN:  Soft, no tenderness.  No guarding or rigidity.

EXTREMITIES:  No edema of the feet.

SKIN: No rash or mass palpable.

NEUROLOGICAL: Patient is awake, alert, oriented times three. Mood and affect normal.



LABS:

Hemoglobin 13.1 and on admission was 12.5, repeat is 6.58.  BUN of 12, creatinine 0.9.

Blood culture anaerobic gram-positive cocci.  Local cultures currently pending.



DIAGNOSTIC IMPRESSION:

1. Patient with right submandibular abscess from infected tooth #18 in this patient

    who is status post extraction of the infected tooth and drainage of the abscess

    with local cultures pending.

2. Patient with anaerobic gram-positive bacteremia, source is right submental abscess

    secondary to the dental infection.

3. Patient with PENICILLIN ALLERGY and that will limit the number of antibiotics safe

    to use.



PLAN:

1. Blood cultures will be repeated to document clearance of bacteremia.

2. Rocephin 2 grams daily and Flagyl should provide adequate coverage while waiting

    for the culture to finalize.

3. We will follow on the clinical condition and culture to further adjust medication

    if needed.



Thank you for this consultation.  Will follow this patient along with you.





MMODL / IJN: [id number] / Job#: [id number]

## 2021-03-04 VITALS — TEMPERATURE: 98.4 F | HEART RATE: 86 BPM | SYSTOLIC BLOOD PRESSURE: 115 MMHG | DIASTOLIC BLOOD PRESSURE: 86 MMHG

## 2021-03-04 VITALS — RESPIRATION RATE: 16 BRPM

## 2021-03-04 LAB
ALBUMIN SERPL-MCNC: 4.5 G/DL (ref 3.8–4.9)
ALBUMIN/GLOB SERPL: 2.05 G/DL (ref 1.6–3.17)
ALP SERPL-CCNC: 150 U/L (ref 41–126)
ALT SERPL-CCNC: 219 U/L (ref 10–49)
ANION GAP SERPL CALC-SCNC: 11.3 MMOL/L (ref 4–12)
AST SERPL-CCNC: 139 U/L (ref 14–35)
BASOPHILS # BLD AUTO: 0.06 X 10*3/UL (ref 0–0.1)
BASOPHILS NFR BLD AUTO: 0.9 %
BUN SERPL-SCNC: 15 MG/DL (ref 9–27)
BUN/CREAT SERPL: 18.75 RATIO (ref 12–20)
CALCIUM SPEC-MCNC: 9.6 MG/DL (ref 8.7–10.3)
CHLORIDE SERPL-SCNC: 104 MMOL/L (ref 96–109)
CO2 SERPL-SCNC: 21.7 MMOL/L (ref 21.6–31.8)
EOSINOPHIL # BLD AUTO: 0.15 X 10*3/UL (ref 0.04–0.35)
EOSINOPHIL NFR BLD AUTO: 2.2 %
ERYTHROCYTE [DISTWIDTH] IN BLOOD BY AUTOMATED COUNT: 4.95 X 10*6/UL (ref 4.4–5.6)
ERYTHROCYTE [DISTWIDTH] IN BLOOD: 13.3 % (ref 11.5–14.5)
GLOBULIN SER CALC-MCNC: 2.2 G/DL (ref 1.6–3.3)
GLUCOSE SERPL-MCNC: 87 MG/DL (ref 70–110)
HCT VFR BLD AUTO: 43.6 % (ref 39.6–50)
HGB BLD-MCNC: 14.3 G/DL (ref 13–17)
LYMPHOCYTES # SPEC AUTO: 2.02 X 10*3/UL (ref 0.9–5)
LYMPHOCYTES NFR SPEC AUTO: 30.1 %
MCH RBC QN AUTO: 28.9 PG (ref 27–32)
MCHC RBC AUTO-ENTMCNC: 32.8 G/DL (ref 32–37)
MCV RBC AUTO: 88.1 FL (ref 80–97)
MONOCYTES # BLD AUTO: 0.54 X 10*3/UL (ref 0.2–1)
MONOCYTES NFR BLD AUTO: 8 %
NEUTROPHILS # BLD AUTO: 3.66 X 10*3/UL (ref 1.8–7.7)
NEUTROPHILS NFR BLD AUTO: 54.5 %
PLATELET # BLD AUTO: 362 X 10*3/UL (ref 140–440)
POTASSIUM SERPL-SCNC: 4.9 MMOL/L (ref 3.5–5.5)
PROT SERPL-MCNC: 6.7 G/DL (ref 6.2–8.2)
SODIUM SERPL-SCNC: 137 MMOL/L (ref 135–145)
WBC # BLD AUTO: 6.72 X 10*3/UL (ref 4.5–10)

## 2021-03-04 RX ADMIN — METRONIDAZOLE SCH MLS/HR: 500 INJECTION, SOLUTION INTRAVENOUS at 12:08

## 2021-03-04 RX ADMIN — CEFAZOLIN SCH: 330 INJECTION, POWDER, FOR SOLUTION INTRAMUSCULAR; INTRAVENOUS at 00:06

## 2021-03-04 RX ADMIN — HYDROCODONE BITARTRATE AND ACETAMINOPHEN PRN EACH: 7.5; 325 TABLET ORAL at 05:44

## 2021-03-04 RX ADMIN — METRONIDAZOLE SCH MLS/HR: 500 INJECTION, SOLUTION INTRAVENOUS at 05:44

## 2021-03-04 RX ADMIN — METRONIDAZOLE SCH MLS/HR: 500 INJECTION, SOLUTION INTRAVENOUS at 00:08

## 2021-03-04 RX ADMIN — HEPARIN SODIUM SCH: 5000 INJECTION, SOLUTION INTRAVENOUS; SUBCUTANEOUS at 07:50

## 2021-03-04 RX ADMIN — HYDROCODONE BITARTRATE AND ACETAMINOPHEN PRN EACH: 7.5; 325 TABLET ORAL at 11:45

## 2021-03-04 RX ADMIN — IBUPROFEN SCH MG: 600 TABLET ORAL at 07:46

## 2021-03-04 RX ADMIN — NICOTINE SCH PATCH: 21 PATCH, EXTENDED RELEASE TRANSDERMAL at 07:46

## 2021-03-04 RX ADMIN — IBUPROFEN SCH MG: 600 TABLET ORAL at 12:56

## 2021-03-04 RX ADMIN — FAMOTIDINE SCH MG: 10 INJECTION, SOLUTION INTRAVENOUS at 07:49

## 2021-03-04 RX ADMIN — HYDROCODONE BITARTRATE AND ACETAMINOPHEN PRN EACH: 7.5; 325 TABLET ORAL at 00:04

## 2021-03-04 RX ADMIN — ASPIRIN SCH MG: 325 TABLET ORAL at 08:43

## 2021-03-04 NOTE — P.DS
Providers


Date of admission: 


03/01/21 09:08





Expected date of discharge: 03/04/21


Attending physician: 


Negro Salvador MD





Consults: 





                                        





02/26/21 20:53


Consult Physician Urgent 


   Consulting Provider: Raymundo Garcia


   Consult Reason/Comments: Submandibular abscess


   Do you want consulting provider notified?: Yes





02/27/21 10:10


Consult Physician Stat 


   Consulting Provider: Jaden Ventura


   Consult Reason/Comments: tooth abcsess


   Do you want consulting provider notified?: Yes





03/02/21 12:42


Consult Physician Routine 


   Consulting Provider: Karl Paris


   Consult Reason/Comments: antibiotic management


   Do you want consulting provider notified?: Yes











Primary care physician: 


Nyla Ceja





Hospital Course: 








Final diagnosis


Acute right submandibular abscess with sepsis, status post incision and drainage

with dental abscess


Anaerobic gram-positive cocci sepsis


History of carious teeth and tooth abscess


Elevated AST, ALT


Tachycardia


history of asthma


Increased white blood count leukocytosis with secondary to sepsis


elevated random glucose


history of degenerative joint disease


History of cholecystectomy


history of attention deficit disorder, attention deficit hyperactivity disorder


History of continued ongoing nicotine dependence


History of THC











Discharge disposition


Patient is being discharged in a stable condition with guarded prognosis to home

and will continue with home care in the outpatient setting.  Patient will 

follow-up with Dr. Ceja in the outpatient setting upon discharge.  Patient 

also instructed to follow-up with infectious disease in 1-2 weeks.  Patient to 

continue following closely with his dentist as well.  Patient will continue on 

IV antibiotics in the form of ceftriaxone 2 g daily for the next 10 days along 

with oral Flagyl 500 mg 3 times daily for the next 10 days.  Total time taken is

greater than 35 minutes.





Hospital course


This is a 25-year-old male who was recently admitted with acute right 

submandibular abscess with sepsis and was being closely monitored.  Patient was 

seen and evaluated by oral surgery Dr. Ventura and underwent incision and drainage 

and had drains placed.  Infectious disease also following as patient's initial 

blood cultures showing gram-positive cocci with most recent blood cultures being

negative.  Patient is receiving a midline and will continue with IV ceftriaxone 

2 g daily for the next 10 days along with oral Flagyl 500 mg 3 times daily for 

the next 10 days.  Patient will follow-up closely with oral surgery along with 

infectious disease in the outpatient setting.  Case management has arranged for 

in-home IV antibiotic infusions and Homecare.  Patient instructed to continue 

with a soft diet and avoid tobacco use.  Currently no reports of chest pain, 

shortness of breath, or palpitations.  Patient is afebrile.  No reports of 

nausea or vomiting and patient is tolerating diet.  Patient will be going home 

today.





On exam vital signs are stable.  Cardio S1, S2 are muffled.  Respiratory system 

shows diminished breath sounds at the bases with no wheezing or rhonchi noted.  

Abdomen is soft and nontender.  Nervous system shows no focal deficits.





Please refer to medication reconciliation sheet for a list of medications.


Patient Condition at Discharge: Stable





Plan - Discharge Summary


Discharge Rx Participant: Yes


New Discharge Prescriptions: 


New


   metroNIDAZOLE [Flagyl] 500 mg PO TID #30 tab


   cefTRIAXone [Rocephin] 2 gm IVPB Q24H #10 bag


   HYDROcodone/APAP 7.5-325MG [Norco 7.5-325] 2 each PO Q6H PRN #12 tab


     PRN Reason: Pain





Continue


   Dextroamphetamine/Amphetamine [Dextroamphetamine/Amphetamine ER 30 mg Cap] 30

mg PO DAILY


   Ibuprofen [Motrin] 800 mg PO TID PRN #30 tab


     PRN Reason: Pain





Discontinued


   Clindamycin HCl 300 mg PO Q6HR #40 cap


Discharge Medication List





Dextroamphetamine/Amphetamine [Dextroamphetamine/Amphetamine ER 30 mg Cap] 30 mg

PO DAILY 03/03/21 [History]


cefTRIAXone [Rocephin] 2 gm IVPB Q24H #10 bag 03/03/21 [Rx]


metroNIDAZOLE [Flagyl] 500 mg PO TID #30 tab 03/03/21 [Rx]


HYDROcodone/APAP 7.5-325MG [Norco 7.5-325] 2 each PO Q6H PRN #12 tab 03/04/21 

[Rx]


Ibuprofen [Motrin] 800 mg PO TID PRN #30 tab 03/04/21 [Rx]








Follow up Appointment(s)/Referral(s): 


Nyla Ceja DO [Primary Care Provider] - 1-2 days


[facility], [NON-STAFF] -  ([facility] will call you to coordinate 

your first visit.  They will begin teaching and IV antibiotic infusions on: 

3/5/21.)


[facility], [REFERRING] -  ([facility] will call before 

delivering IV antibiotics. They will deliver on: 3/4/21.)


Karl Paris MD [STAFF PHYSICIAN] - 1 Week


Ambulatory/Diagnostic Orders: 


Basic Metabolic Panel [LAB.AMB] Location: None Selected


C Reactive Protein [LAB.AMB] Location: None Selected


Complete Blood Count w/diff [LAB.AMB] Location: None Selected


Erythrocyte Sedimentation Rate [LAB.AMB] Location: None Selected


Activity/Diet/Wound Care/Special Instructions: 


Activity limited until follow up


follow up with infectious disease outpatient


follow up with dentist upon discharge


continue with antibiotics until finished


continue current diet


avoid tobacco use

## 2021-03-04 NOTE — P.PN
Subjective


Progress Note Date: 03/04/21


Principal diagnosis: 





Dental abscess related to the tooth #18


5 days postop incision and drainage of submandibular abscess and extraction of 

tooth #18





Saturday afternoon patient had a incision and drainage 2 of the right neck 

minimal purulent discharge as well as incision and drainage of the right 

temporal space through an intraoral incision as well as surgical extraction of t

ooth #18.  Patient reported improvement immediately upon postop with his 

discomfort.  


Has resonded well t cospital course and contunes to et yellow pus fron neck 

drain


 





Objective





- Vital Signs


Vital signs: 


                                   Vital Signs











Temp  98.4 F   03/04/21 07:00


 


Pulse  86   03/04/21 07:00


 


Resp  16   03/04/21 07:00


 


BP  115/86   03/04/21 07:00


 


Pulse Ox  97   03/04/21 07:00








                                 Intake & Output











 03/03/21 03/04/21 03/04/21





 18:59 06:59 18:59


 


Intake Total 300 800 


 


Balance 300 800 


 


Intake:   


 


  Intake, IV Titration  320 





  Amount   


 


    Sodium Chloride 0.9% 1,  220 





    000 ml @ 20 mls/hr IV .   





    Q24H ELISEO Rx#:[id number]   


 


    metroNIDAZOLE-NS   100 





    mg In Saline 1 100ml.bag   





    @ 100 mls/hr IVPB Q6HR   





    ELISEO Rx#:[id number]   


 


  Oral 300 480 


 


Other:   


 


  # Voids 1 3 














- Exam


Patient is standing dressed bags packed desire desires discharge.  Reports 

continued discharge from drain tube overnight but the neck swelling continues to

improve.  Mouth opening is improved to approximately 20 mm.  Dressing neck was 

just changed our ago the patient reports any still has a quarter-sized area of 

yellow pus.  





- Labs


CBC & Chem 7: 


                                 03/04/21 05:19





                                 03/04/21 05:19


Labs: 


                  Abnormal Lab Results - Last 24 Hours (Table)











  03/04/21 03/04/21 Range/Units





  05:19 05:19 


 


MPV  9.4 L   (9.5-12.2)  fL


 


Immature Gran #  0.29 H   (0.00-0.04)  X 10*3/uL


 


AST   139 H  (14-35)  U/L


 


ALT   219 H  (10-49)  U/L


 


Alkaline Phosphatase   150 H  ()  U/L








                      Microbiology - Last 24 Hours (Table)











 03/03/21 06:21 Blood Culture - Preliminary





 Blood    No Growth after 24 hours


 


 03/02/21 15:13 Blood Culture - Preliminary





 Blood    No Growth after 24 hours


 


 02/26/21 18:55 Blood Culture Gram Stain - Final





 Blood Blood Culture - Final





    Anaerobic Gram Positive Cocci


 


 02/26/21 19:09 Blood Culture Gram Stain - Final





 Blood Blood Culture - Final





    Anaerobic Gram Positive Cocci


 


 02/27/21 16:00 Gram Stain - Final





 Neck Wound Culture - Final














Assessment and Plan


Assessment: 


Dental abscess of the right temporal and right submandibular space appears to be

improving with current treatment. 


Plan: 


Patient  has slow improvement.  Meets discharge criteria.  Patient strongly 

desires discharge.  Patient's hospital course has been good but slow and I 

suspect the drain will stay in for another 2-4 days.  Recommend patient follow-

up with me tomorrow in the office and Monday in the office.

## 2021-03-04 NOTE — PN
PROGRESS NOTE



DATE OF SERVICE:

03/04/2021



REASON FOR FOLLOWUP:

Right submandibular abscess from infected tooth.



INTERVAL HISTORY:

The patient is currently afebrile. Patient is breathing comfortably. Patient denies

having any chest pain or any cough.  Overall pain and discomfort to the right lower jaw

has improved.  No nausea, vomiting or diarrhea.



PHYSICAL EXAMINATION:

Blood pressure 115/86, pulse of 86, temperature 98.4.  He is 97% on room air. General

description is a middle-aged male up in the chair in no distress.

HEENT EXAMINATION: Right lower jaw swelling has improved.

LUNGS: Unlabored breathing, clear to auscultation.

HEART: S1, S2.  Regular rate and rhythm.

ABDOMEN:  Soft, no tenderness.



LABS:

Hemoglobin is 14.3, white count 6.72, creatinine 0.8.  Blood culture repeat has been

negative.



DIAGNOSTIC IMPRESSION AND PLAN:

Patient with right submandibular abscess status post drainage with removal of infected

tooth.  Blood culture with anaerobic gram-positive cocci.  Wound culture negative.  He

will finish therapy with Rocephin 2 grams daily and oral Flagyl for a total of 2 weeks

and close outpatient followup.





MMODL / IJN: [id number] / Job#: [id number]